# Patient Record
Sex: MALE | Race: WHITE | Employment: OTHER | URBAN - METROPOLITAN AREA
[De-identification: names, ages, dates, MRNs, and addresses within clinical notes are randomized per-mention and may not be internally consistent; named-entity substitution may affect disease eponyms.]

---

## 2017-09-18 ENCOUNTER — HOSPITAL ENCOUNTER (OUTPATIENT)
Dept: LAB | Age: 77
Discharge: HOME | End: 2017-09-18

## 2017-09-18 PROCEDURE — 88305 TISSUE EXAM BY PATHOLOGIST: CPT | Performed by: DERMATOLOGY

## 2017-10-02 ENCOUNTER — HOSPITAL ENCOUNTER (OUTPATIENT)
Dept: LAB | Age: 77
Discharge: HOME | End: 2017-10-02

## 2017-10-11 ENCOUNTER — TELEPHONE (OUTPATIENT)
Dept: DERMATOLOGY | Facility: AMBULATORY SURGERY CENTER | Age: 77
End: 2017-10-11

## 2017-10-26 ENCOUNTER — HOSPITAL ENCOUNTER (OUTPATIENT)
Dept: LAB | Age: 77
Discharge: HOME | End: 2017-10-26

## 2017-10-26 ENCOUNTER — OFFICE VISIT (OUTPATIENT)
Dept: DERMATOLOGY | Facility: AMBULATORY SURGERY CENTER | Age: 77
End: 2017-10-26

## 2017-10-26 VITALS
RESPIRATION RATE: 18 BRPM | HEIGHT: 67 IN | BODY MASS INDEX: 20.4 KG/M2 | HEART RATE: 60 BPM | WEIGHT: 130 LBS | DIASTOLIC BLOOD PRESSURE: 60 MMHG | TEMPERATURE: 98.2 F | OXYGEN SATURATION: 97 % | SYSTOLIC BLOOD PRESSURE: 130 MMHG

## 2017-10-26 DIAGNOSIS — D48.5 NEOPLASM OF UNCERTAIN BEHAVIOR OF SKIN OF TEMPORAL REGION: Primary | ICD-10-CM

## 2017-10-26 DIAGNOSIS — C44.329 SQUAMOUS CELL CARCINOMA OF SKIN OF RIGHT TEMPLE: Primary | ICD-10-CM

## 2017-10-26 PROCEDURE — 88305 TISSUE EXAM BY PATHOLOGIST: CPT | Performed by: NURSE PRACTITIONER

## 2017-10-26 RX ORDER — DOXYCYCLINE 100 MG/1
100 TABLET ORAL 2 TIMES DAILY
Status: ON HOLD | COMMUNITY
End: 2020-08-31

## 2017-10-26 RX ORDER — ISOSORBIDE MONONITRATE 30 MG/1
TABLET, EXTENDED RELEASE ORAL DAILY
COMMUNITY

## 2017-10-26 RX ORDER — BUMETANIDE 2 MG/1
1 TABLET ORAL DAILY
Status: ON HOLD | COMMUNITY
End: 2020-08-31

## 2017-10-26 NOTE — PATIENT INSTRUCTIONS
WOUND CARE INSTRUCTIONS    1. Keep the dressing clean and dry and do not remove for 48 hours. 2. Then change the dressing once a day as follows:  a. Wash hands before and after each dressing change. b. Remove dressing and wash site gently with mild soap and water, rinse, and pat dry.  c. Apply an ointment (Bacitracin, Polysporin, Neosporin, Petroleum jelly or Aquaphor). d. Apply a non-stick (Telfa) dressing or Band-Aid to cover the wound. Remove pressure bandage on saturday, then wash gently and apply a thin layer Vaseline and a band-aid to site daily for 1 week. 3. Watch for:  BLEEDING: A small amount of drainage may occur. If bleeding occurs, elevate and rest the surgery site. Apply gauze and steady pressure for 15 minutes. If bleeding continues, call this office. INFECTION: Signs of infection include increased redness, pain, warmth, drainage of pus, and fever. If this occurs, call this office. 4. Special Instructions (follow any that are checked):  · [x] You have stitches that DO NOT need to be removed. · [x] Avoid bending at the waist and heavy lifting for two days. · [x] Sleep with your head elevated for the next two nights. · [x] Rest the surgery site and keep it elevated as much as possible for two days. · [x] You may apply an ice-pack for 10-15 minutes every waking hour for the rest of the day. · [] Eat a soft diet and avoid hot food and hot drinks for the rest of the day. · [] Other instructions: Follow up as directed. Take Tylenol or Ibuprofen for pain as needed. Once the site is healed with no remaining bandages or open areas, protect your surgical site and scar from the sun, as this area will be more sensitive. Use a broad spectrum sunscreen SPF 30 or higher daily, and a chemical free product (one containing zinc oxide or titanium dioxide) is a good choice if the area is sensitive.     You may begin to gently massage the surgical site in 2-3 weeks, rubbing in a circular motion along the scar. This can help reduce swelling and thickness of a scar. A scar cream may be used beginnning 1 month after the surgery. If you have any questions or concerns, please call our office Monday through Friday at [phone].

## 2017-10-26 NOTE — PROGRESS NOTES
This note is written by Hari Speaker, as dictated by Silver Plasencia. Lucius Sosa MD.    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 male referred by Dr. Jumana Brown. He presented today for a consult. 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 This is a new squamous cell carcinoma present for many months described as a growing, bleeding, and crusting lesion with no prior treatment. Biopsy confirmed the diagnosis of squamous cell carcinoma in situ, and I reviewed the written pathology report. He reports the use of vinegar soaks. He is feeling well and in his usual state of health today. He has no pain, no current illnesses, no other skin concerns. His allergies, medications, medical, and social history are reviewed by me today. He has a biopsy- proven nodular and pigmented basal cell carcinoma on his left nasal tip. He has a biopsy-proven invasive squamous cell carcinoma, moderately differentiated, on his left temple. He has a biopsy-proven squamous cell carcinoma in situ on his left ulnar forearm. He has a biopsy-proven squamous cell carcinoma in situ on his right dorsal forearm. He has a defibrillator and is taking the blood thinner Plavix. Exam:     He is an awake, alert, and oriented 76 y.o. male who appears well and in no distress. There is no preauricular, submandibular, or cervical lymphadenopathy. I examined his face. He has a 42 x 40 mm exophytic odorous tumor on his right temple. He confirms location. Assessment/plan:    1. Squamous cell carcinoma at least in situ, right temple. A shave removal was advised to debulk this lesion and to have pathology review to confirm invasive diagnosis, which is expected over in situ disease. The procedure was reviewed and verbal and written consent were obtained. The risks of pain, bleeding, infection, recurrence and scar were discussed.   I performed the procedure. The site was cleansed and anesthetized with 1% Lidocaine with Epinephrine 1:100,000. A shave removal was performed to debulk the lesion. Hemostasis was obtained with heat and pressure. The specimen was sent to pathology. We have elected to proceed with Mohs surgery today to treat the remaining squamous cell carcinoma in situ on his right temple. Mohs surgery is indicated by site, size, and histology. The procedure was discussed, verbal and written consent were obtained. I performed the procedure. One stage was required to reach a tumor free plane. The surgical defect was managed with a bilateral advancement flap. There were no complications. He will follow up as needed as the site heals. Indications, risks, and options were discussed with Vianney Feliciano preoperatively. Risks including, but not limited to: pain, bleeding, infection, tumor recurrence, scarring and damage to motor and/or sensory nerves, were discussed. Vianney Feliciano chose Mohs surgery. Vianney Feliciano was an acceptable surgery candidate. Vianney Feliciano was placed in the appropriate position on the operating table in the Mohs surgery procedure room. The area was prepped and draped in the standard manner. Gentian violet was used to outline the clinical margins of the tumor. Local anesthesia was then obtained. The grossly visible tumor was then removed, an underlying layer was excised and mapped according to the Mohs technique, and the individual specimens examined microscopically. The process was repeated until microscopic examination of the tissue specimens confirmed a tumor-free plane. Hemostasis was obtained with electrosurgery and pressure. The wound was covered between stages with moist saline gauze. The wound management options of second intent healing, layered closure, local flap, or full thickness skin graft were discussed.  Mr. Fern Rutherfordpin understands the aims, risks, alternatives, and possible complications and elects to proceed with a bilateral advancement flap. Mr. Claire Bear was placed in a supine position on the operating table in the Mohs surgery procedure room. The area was prepped and draped in the standard manner. Gentian violet was used to outline the proposed flap.  1% lidocaine with epinephrine 1:100,000 was used to supplement the already existing anesthesia. The wound penetrated to the fascial layer and measured 40 x 26 mm. The wound margins were undermined in the subcutaneous plane 1 cm around the defect, creating a flap size of 60 by 46 mm. The flap was advanced into place. Hemostasis was obtained with heat and pressure. 4-0 polysorb sutures were used to approximate the deep tissues and 5-0 fast gut sutures were used to approximate the skin edges. A pressure dressing utilizing Vaseline, Telfa, gauze and Coverroll was placed. Wound care instructions (written and verbal) and a follow up appointment were given to the patient before discharge. Mr. Claire Bear was discharged in good condition. 2. Nonmelanoma skin cancers. He will return in December and January to address these lesions. 3. History of nonmelanoma skin cancer. I discussed the diagnosis and recommend routine examinations with Dr. Denisha Fallon for surveillance. The documentation recorded by the scribe accurately reflects the service I personally performed and the decisions made by me. [facility]   OFFICE PROCEDURE PROGRESS NOTE     Chart reviewed for the following:     Jovana Guzman MD, have reviewed the History, Physical and updated the Allergic reactions for [address] [place] [address] performed immediately prior to start of procedure:     Jovana Guzman MD, have performed the following reviews on Edgard Glover prior to the start of the procedure:     * Patient was identified by name and date of birth   * Agreement on procedure being performed was verified   * Risks and Benefits explained to the patient   * Procedure site verified and marked as necessary   * Patient was positioned for comfort   * Consent was signed and verified     Time: 1:30 PM   Date of procedure: 10/26/2017  Procedure performed by: Andrew Hall.  Diamond Portillo MD   Provider assisted by: MARTHA  Patient assisted by: self   How tolerated by patient: tolerated the procedure well with no complications   Comments: none

## 2017-10-26 NOTE — MR AVS SNAPSHOT
Visit Information Date & Time Provider Department Dept. Phone Encounter #  
 10/26/2017  2:00 PM MD Darrick Giraldo [phone] [id number] Your Appointments 10/26/2017  2:00 PM  
SURGERY with MD Darrick Giraldo [address]) Appt Note: ok per Dr. Luli Loyola [facility] [address] [facility] [address]  
[phone]  
  
   
 [place] [id number]  
  
    
 12/19/2017  9:30 AM  
SURGERY with MD Darrick Giraldo [address]) Appt Note: [facility] [address] [place] [address]  
[phone]  
  
    
 1/2/2018  8:30 AM  
SURGERY with MD Darrick Giraldo [address]) Appt Note: [facility] [address]  
[phone] 1/15/2018  9:30 AM  
SURGERY with MD Darrick Giraldo [address]) Appt Note: [address] [facility]  [address]  
[phone] Upcoming Health Maintenance Date Due DTaP/Tdap/Td series (1 - Tdap) 5/28/1959 ZOSTER VACCINE AGE 60> 3/28/1998 GLAUCOMA SCREENING Q2Y 5/28/2003 MEDICARE YEARLY EXAM 5/28/2003 Pneumococcal 65+ Low/Medium Risk (2 of 2 - PPSV23) 3/14/2017 INFLUENZA AGE 9 TO ADULT 8/1/2017 Allergies as of 10/26/2017  Review Complete On: 10/26/2017 By: Nolvia Ricks Severity Noted Reaction Type Reactions Codeine  04/29/2011    Unknown (comments) Penicillins  04/29/2011    Unknown (comments) Current Immunizations  Reviewed on 3/25/2012 Name Date Influenza Vaccine Split 11/16/2011 ZZZ-RETIRED (DO NOT USE) Pneumococcal Vaccine (Unspecified Type) 3/14/2012 11:51 AM  
  
 Not reviewed this visit Vitals Smoking Status Current Every Day Smoker Your Updated Medication List  
  
   
This list is accurate as of: 10/26/17  1:39 PM.  Always use your most recent med list.  
  
  
  
  
 albuterol 90 mcg/actuation inhaler Commonly known as:  PROVENTIL HFA, VENTOLIN HFA, PROAIR HFA Take 1 Puff by inhalation every six (6) hours as needed for Wheezing or Shortness of Breath. aluminum-magnesium hydroxide 200-200 mg/5 mL suspension Commonly known as:  MAALOX Take 15 mL by mouth four (4) times daily as needed for Indigestion. aspirin 81 mg chewable tablet Take 1 Tab by mouth daily. budesonide-formoterol 160-4.5 mcg/actuation Hfaa Commonly known as:  SYMBICORT Take 2 Puffs by inhalation two (2) times a day. bumetanide 2 mg tablet Commonly known as:  Kristy Bety Take 1 mg by mouth daily. calcium carbonate 200 mg calcium (500 mg) Chew Commonly known as:  TUMS Take 1 Tab by mouth three (3) times daily (with meals). carvedilol 12.5 mg tablet Commonly known as:  Wileen Qiu Take 1 Tab by mouth two (2) times daily (with meals). doxazosin 8 mg tablet Commonly known as:  CARDURA Take 8 mg by mouth daily. doxycycline 100 mg tablet Commonly known as:  ADOXA Take 100 mg by mouth two (2) times a day. fenofibrate nanocrystallized 145 mg tablet Commonly known as:  [employer] Take 1 Tab by mouth daily. furosemide 40 mg tablet Commonly known as:  LASIX 40 mg two times daily  
  
 isosorbide mononitrate ER 30 mg tablet Commonly known as:  IMDUR Take  by mouth daily. LIPITOR PO Take 10 mg by mouth daily. lisinopril 5 mg tablet Commonly known as:  Donnald Code Take 1 Tab by mouth daily. oxyCODONE-acetaminophen 5-325 mg per tablet Commonly known as:  PERCOCET Take 1 Tab by mouth every four (4) hours as needed for Pain.  
  
 pantoprazole 40 mg tablet Commonly known as:  PROTONIX Take 1 Tab by mouth daily. PLAVIX 75 mg Tab Generic drug:  clopidogrel Take 75 mg by mouth daily. potassium chloride SR 10 mEq tablet Commonly known as:  KLOR-CON 10 Take 1 Tab by mouth two (2) times a day. Patient Instructions WOUND CARE INSTRUCTIONS 1. Keep the dressing clean and dry and do not remove for 48 hours. 2. Then change the dressing once a day as follows: 
a. Wash hands before and after each dressing change. b. Remove dressing and wash site gently with mild soap and water, rinse, and pat dry. 
c. Apply an ointment (Bacitracin, Polysporin, Neosporin, Petroleum jelly or Aquaphor). d. Apply a non-stick (Telfa) dressing or Band-Aid to cover the wound. Remove pressure bandage on saturday, then wash gently and apply a thin layer Vaseline and a band-aid to site daily for 1 week. 3. Watch for: BLEEDING: A small amount of drainage may occur. If bleeding occurs, elevate and rest the surgery site. Apply gauze and steady pressure for 15 minutes. If bleeding continues, call this office. INFECTION: Signs of infection include increased redness, pain, warmth, drainage of pus, and fever. If this occurs, call this office. 4. Special Instructions (follow any that are checked): ·  You have stitches that DO NOT need to be removed. ·  Avoid bending at the waist and heavy lifting for two days. ·  Sleep with your head elevated for the next two nights. ·  Rest the surgery site and keep it elevated as much as possible for two days. ·  You may apply an ice-pack for 10-15 minutes every waking hour for the rest of the day. ·  Eat a soft diet and avoid hot food and hot drinks for the rest of the day. ·  Other instructions: Follow up as directed. Take Tylenol or Ibuprofen for pain as needed.  
 
 
Once the site is healed with no remaining bandages or open areas, protect your surgical site and scar from the sun, as this area will be more sensitive. Use a broad spectrum sunscreen SPF 30 or higher daily, and a chemical free product (one containing zinc oxide or titanium dioxide) is a good choice if the area is sensitive. You may begin to gently massage the surgical site in 2-3 weeks, rubbing in a circular motion along the scar. This can help reduce swelling and thickness of a scar. A scar cream may be used beginnning 1 month after the surgery. If you have any questions or concerns, please call our office Monday through Friday at [phone]. Introducing [facility]! Marilyn Mora introduces [facility] patient portal. Now you can access parts of your medical record, email your doctor's office, and request medication refills online. 1. In your internet browser, go to [email or web address]. [employer]/"[employer] 2. Click on the First Time User? Click Here link in the Sign In box. You will see the New Member Sign Up page. 3. Enter your [facility] Access Code exactly as it appears below. You will not need to use this code after youve completed the sign-up process. If you do not sign up before the expiration date, you must request a new code. · [facility] Access Code: [email or web address] Expires: 1/24/2018  1:39 PM 
 
4. Enter the last four digits of your Social Security Number (xxxx) and Date of Birth (mm/dd/yyyy) as indicated and click Submit. You will be taken to the next sign-up page. 5. Create a [facility] ID. This will be your [facility] login ID and cannot be changed, so think of one that is secure and easy to remember. 6. Create a [facility] password. You can change your password at any time. 7. Enter your Password Reset Question and Answer. This can be used at a later time if you forget your password. 8. Enter your e-mail address. You will receive e-mail notification when new information is available in [address] 9. Click Sign Up. You can now view and download portions of your medical record. 10. Click the Download Summary menu link to download a portable copy of your medical information. If you have questions, please visit the Frequently Asked Questions section of the [employer] website. Remember, [employer] is NOT to be used for urgent needs. For medical emergencies, dial 911. Now available from your iPhone and Android! Please provide this summary of care documentation to your next provider. Your primary care clinician is listed as Sabina Valencia. If you have any questions after today's visit, please call [phone].

## 2017-10-26 NOTE — PROGRESS NOTES
Written by Aristeo Goldberg, as dictated by Rita Juares, [address]. Name: Bruna Garcia       Age: 76 y.o. Date: 10/26/2017    Chief Complaint: had no chief complaint listed for this encounter. Subjective:    HPI:  Mr.. Bruna Garcia is a 76 y.o. male who was kindly referred by Dr. Radha Shay and presents for a consult prior to Mohs surgery for a lesion on the right temple. The lesion appeared many months ago. The patient has not had any prior treatment. He has been utilizing vinegar soaks. Associated symptoms include growth, odor, bleeding, and crusting. The patient's pathology report confirms squamous cell carcinoma in situ on his right temple. He has a biopsy- proven nodular and pigmented basal cell carcinoma on his left nasal tip. He has a biopsy-proven invasive squamous cell carcinoma, moderately differentiated, on his left temple. He has a biopsy-proven squamous cell carcinoma in situ on his left ulnar forearm. He has a biopsy-proven squamous cell carcinoma in situ on his right dorsal forearm. He has a defibrillator and is taking the blood thinner Plavix. ROS: Consitutional: Negative. Dermatological : positive for - skin lesion changes    Social History     Social History    Marital status:      Spouse name: N/A    Number of children: N/A    Years of education: N/A     Occupational History    Not on file.      Social History Main Topics    Smoking status: Current Every Day Smoker     Packs/day: 1.00     Years: 55.00    Smokeless tobacco: Not on file    Alcohol use No    Drug use: Yes     Special: Prescription    Sexual activity: Not Currently     Other Topics Concern    Not on file     Social History Narrative    No narrative on file       Family History   Problem Relation Age of Onset    Heart Disease Mother     Heart Disease Father        Past Medical History:   Diagnosis Date    CAD (coronary artery disease)     [facility] [address] 2005    prostate    [facility] failure ([facility] [address]     Hypertension     Thromboembolus ([facility] [address] 2005    r leg       Past Surgical History:   Procedure Laterality Date    CARDIAC SURG PROCEDURE UNLIST  2006    cardiac bypass    HX APPENDECTOMY      HX OTHER SURGICAL      Defibrillator (external)    HX PROSTATECTOMY      prostate sgy       Current Outpatient Prescriptions   Medication Sig Dispense Refill    oxyCODONE-acetaminophen (PERCOCET) 5-325 mg per tablet Take 1 Tab by mouth every four (4) hours as needed for Pain. 30 Tab 0    furosemide (LASIX) 40 mg tablet 40 mg two times daily   60 Tab 0    potassium chloride SR (KLOR-CON 10) 10 mEq tablet Take 1 Tab by mouth two (2) times a day. 60 Tab 0    lisinopril (PRINIVIL, ZESTRIL) 5 mg tablet Take 1 Tab by mouth daily. 30 Tab 0    fenofibrate nanocrystallized (TRICOR) 145 mg tablet Take 1 Tab by mouth daily. 30 Tab 0    carvedilol (COREG) 12.5 mg tablet Take 1 Tab by mouth two (2) times daily (with meals). 30 Tab 0    calcium carbonate (TUMS) 200 mg calcium (500 mg) Chew Take 1 Tab by mouth three (3) times daily (with meals). 30 Tab 0    budesonide-formoterol (SYMBICORT) 160-4.5 mcg/Actuation HFA inhaler Take 2 Puffs by inhalation two (2) times a day. 1 Inhaler 1    aspirin 81 mg chewable tablet Take 1 Tab by mouth daily. 30 Tab 0    aluminum-magnesium hydroxide (MAALOX) 200-200 mg/5 mL suspension Take 15 mL by mouth four (4) times daily as needed for Indigestion. 300 mL 0    pantoprazole (PROTONIX) 40 mg tablet Take 1 Tab by mouth daily. 30 Tab 0    albuterol (PROVENTIL HFA, VENTOLIN HFA) 90 mcg/Actuation inhaler Take 1 Puff by inhalation every six (6) hours as needed for Wheezing or Shortness of Breath. 1 Inhaler 1    doxazosin (CARDURA) 8 mg tablet Take 8 mg by mouth daily.  clopidogrel (PLAVIX) 75 mg tablet Take 75 mg by mouth daily.  ATORVASTATIN CALCIUM (LIPITOR PO) Take 10 mg by mouth daily.          Allergies   Allergen Reactions    Codeine Unknown (comments)    Penicillins Unknown (comments)         Objective: There were no vitals taken for this visit. Basilia Waters is a 76 y.o. male who appears well and in no distress. He is awake, alert, and oriented. There is no preauricular, submandibular, or cervical lymphadenopathy. A limited skin evaluation was completed including his face and arms. He has a 42 x 40 mm exophytic tumor on his right temple. He has a pink shiny papule on his left nasal tip. He has a crusted lesion on his left temple. He has a crusted lesion on his left ulnar forearm. He has a crusted lesion on his right dorsal forearm. There is a crusted papule on the left neck. Assessment/Plan:    1. Squamous cell carcinoma in situ, right temple. I discussed the diagnosis of squamous cell carcinoma in situ and summarized the pathology report. Mohs surgery is indicated by site, size, and histology. The procedure was discussed. We discussed the option of a shave removal to debulk the tumor. The procedure was reviewed and verbal and written consent were obtained. The risks of pain, bleeding, infection, recurrence and scar were discussed. Dr. Ольга Panchal performed the procedure. The site was cleansed and anesthetized with 1% Lidocaine with Epinephrine 1:100,000 by me. A shave removal was performed to debulk the tumor. Hemostasis was obtained with heat. Dr. Ольга Panchal will proceed with Mohs surgery today. 2. Basal cell carcinoma, left nasal tip. I discussed the diagnosis of basal cell carcinoma and summarized the pathology report. 3. Squamous cell carcinoma, left temple. I discussed the diagnosis of squamous cell carcinoma and summarized the pathology report. 4. Squamous cell carcinoma in situ, left ulnar forearm. I discussed the diagnosis of squamous cell carcinoma in situ and summarized the pathology report. We discussed the option of a topical treatment. 5. Squamous cell carcinoma in situ, right dorsal forearm.  I discussed the diagnosis of squamous cell carcinoma in situ and summarized the pathology report. We discussed the option of a topical treatment. This plan was reviewed with the patient and patient agrees. All questions were answered. This scribe documentation was reviewed by me and accurately reflects the examination and decisions made by me. [facility]   OFFICE PROCEDURE PROGRESS NOTE   Chart reviewed for the following:   Lewis WALLS, have reviewed the History, Physical and updated the Allergic reactions for Saravia Form. TIME OUT performed immediately prior to start of procedure:   Angelita WALLS, have performed the following reviews on Saravia Form   prior to the start of the procedure:     * Patient was identified by name and date of birth   * Agreement on procedure being performed was verified   * Risks and Benefits explained to the patient   * Procedure site verified and marked as necessary   * Patient was positioned for comfort   * Verbal consent was obtained    Time: 1:21 PM  Date of procedure: 10/26/2017  Procedure performed by: Dr. Sagar Bustos  Provider assisted by: me  Patient assisted by: self   How tolerated by patient: tolerated the procedure well with no complications   Comments: none    He understands that he will need multiple follow up visits with Dr. Lisa Sepulveda and us to identify and accomplish removal of the skin cancers.

## 2017-11-06 ENCOUNTER — HOSPITAL ENCOUNTER (OUTPATIENT)
Dept: LAB | Age: 77
Discharge: HOME | End: 2017-11-06

## 2017-11-06 NOTE — TELEPHONE ENCOUNTER
I called to check on the patient - he states the area is doing well post-op. I reviewed the results of the biopsy (debulking) with him confirming invasive SCC. This has already been treated with Mohs.

## 2017-12-19 ENCOUNTER — OFFICE VISIT (OUTPATIENT)
Dept: DERMATOLOGY | Facility: AMBULATORY SURGERY CENTER | Age: 77
End: 2017-12-19

## 2017-12-19 VITALS
HEART RATE: 86 BPM | OXYGEN SATURATION: 99 % | SYSTOLIC BLOOD PRESSURE: 134 MMHG | DIASTOLIC BLOOD PRESSURE: 84 MMHG | TEMPERATURE: 97.4 F | RESPIRATION RATE: 16 BRPM

## 2017-12-19 DIAGNOSIS — C44.329 SQUAMOUS CELL CARCINOMA OF SKIN OF LEFT TEMPLE: Primary | ICD-10-CM

## 2017-12-19 NOTE — PATIENT INSTRUCTIONS
WOUND CARE INSTRUCTIONS    1. Keep the dressing clean and dry and do not remove for 48 hours. 2. Then change the dressing once a day as follows:  a. Wash hands before and after each dressing change. b. Remove dressing and wash site gently with mild soap and water, rinse, and pat dry.  c. Apply an ointment (Bacitracin, Polysporin, Neosporin, Petroleum jelly or Aquaphor). d. Apply a non-stick (Telfa) dressing or Band-Aid to cover the wound. Remove pressure bandage on thursday, then wash gently and apply a thin layer Vaseline and a band-aid to site daily for 1 week. 3. Watch for:  BLEEDING: A small amount of drainage may occur. If bleeding occurs, elevate and rest the surgery site. Apply gauze and steady pressure for 15 minutes. If bleeding continues, call this office. INFECTION: Signs of infection include increased redness, pain, warmth, drainage of pus, and fever. If this occurs, call this office. 4. Special Instructions (follow any that are checked):  · [x] You have stitches that DO NOT need to be removed. · [x] Avoid bending at the waist and heavy lifting for two days. · [x] Sleep with your head elevated for the next two nights. · [x] Rest the surgery site and keep it elevated as much as possible for two days. · [] You may apply an ice-pack for 10-15 minutes every waking hour for the rest of the day. · [] Eat a soft diet and avoid hot food and hot drinks for the rest of the day. · [] Other instructions: Follow up as directed. Take Tylenol or Ibuprofen for pain as needed. Once the site is healed with no remaining bandages or open areas, protect your surgical site and scar from the sun, as this area will be more sensitive. Use a broad spectrum sunscreen SPF 30 or higher daily, and a chemical free product (one containing zinc oxide or titanium dioxide) is a good choice if the area is sensitive.     You may begin to gently massage the surgical site in 2-3 weeks, rubbing in a circular motion along the scar. This can help reduce swelling and thickness of a scar. A scar cream may be used beginnning 1 month after the surgery. If you have any questions or concerns, please call our office Monday through Friday at [phone].

## 2017-12-19 NOTE — PROGRESS NOTES
Pre-op: Patient presents today for the evaluation of scc to the left temple. Procedure explained with full understanding. Vitals:    12/19/17 1000   BP: 134/84   Pulse: 86   Resp: 16   Temp: 97.4 °F (36.3 °C)   SpO2: 99%     preoperatively, will continue to monitor. Post-op: Written and verbal post-op wound care instructions given to patient with full understanding of care. Surgical wound bandaged with Vaseline, Telfa, 2x2 gauze, and coverall tape. All questions and concerns addressed. Vitals stable postoperatively.

## 2017-12-19 NOTE — PROGRESS NOTES
Chief complaint: Multiple skin cancers    History of present illness: Mr. Suanne Galeazzi is a 73-year-old man referred by Dr. Alexandrea Sanches. He has clinically and histologically diagnosed neglected skin cancers. 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 He has recovered well from that surgery and feels much better without the large tumor. Today treatment plan for his next largest skin cancer, squamous cell carcinoma proven by biopsy on his left temple. This has increased in size, has crusting and symptom and has not been previously treated. He has a defibrillator. He is feeling well and in his usual state of health today. He has no pain, no current illnesses, no other skin concerns. His allergies medications medical and social history are reviewed by me today. His friend and grandson or with him today. Exam: He is awake alert gentleman who appears well. I examined his face, where there is immediate noticeable improvement on the right side after clearance of the large tumor and healing of the flap. His left temple area has a firm mobile crusted plaque 32 x 24 mm. He confirms location. Assessment/plan:  1. History of squamous cell carcinoma, recently treated. He has healed beautifully. He will continue examinations with Dr. Alexandrea Sanches for surveillance. He will return in my office or treatment of additional skin cancers in 2018.  2. Squamous cell carcinoma, left temple. We discussed the diagnosis. Mohs surgery is indicated by site and size. The procedure was discussed, verbal and written consent were obtained. I performed the procedure. 2 stages were required to reach a tumor free plane. The surgical defect was managed with rotation flap. There were no complications. He will followup as needed as the site heals.                     Indications, risks, and options were discussed with  Dug[place] preoperatively. Risks including, but not limited to: pain, bleeding, infection, tumor recurrence, scarring and damage to motor and/or sensory nerves, were discussed. Mr. Sulma Adam chose Mohs surgery. Mr. Sulma Adam was an acceptable surgery candidate. Mr. Sulma Adam was placed in the appropriate position on the operating table in the Mohs surgery procedure room. The area was prepped and draped in the standard manner. Gentian violet was used to outline the clinical margins of the tumor. Local anesthesia was then obtained. The grossly visible tumor was then removed, an underlying layer was excised and mapped according to the Mohs technique, and the individual specimens examined microscopically. The process was repeated until microscopic examination of the tissue specimens confirmed a tumor-free plane. Hemostasis was obtained with electrosurgery and pressure. The wound was covered between stages with moist saline gauze. Wound care instructions (written and/or verbal) and a follow up appointment were given to Mr. Sulma Adam before discharge. Mr. Sulma Adam was discharged in good condition. The wound management options of second intent healing, layered closure, local flap, or full thickness skin graft were discussed. Mr. Sulma Adam understands the aims, risks, alternatives, and possible complications and elects to proceed with a rotation flap. Mr. Sulma Adam was placed in a supine position on the operating table in the Mohs surgery procedure room. The area was prepped and draped in the standard manner. Gentian violet was used to outline the proposed flap.  1% lidocaine with epinephrine 1:100,000 was used to supplement the already existing anesthesia. The wound penetrated to the fascial layer and measured 47 by 30 mm. The wound margins were undermined in the subcutaneous plane. The flap was rotated into place. Hemostasis was obtained with spot electrocoagulation.   5-0 polysorb sutures were used to approximate the deep tissues and 6-0 fast gut sutures were used to approximate the skin edges. A pressure dressing utilizing Petrolatum ointment, Telfa, gauze and Coverroll was placed. Wound care instructions (written and/or verbal) and a follow up appointment were given to the patient before discharge. Mr. Afia Desai was discharged in good condition.         [facility]  OFFICE PROCEDURE PROGRESS NOTE        Chart reviewed for the following:   Makeda Davis MD, have reviewed the History, Physical and updated the Allergic reactions for [address] [place] [address] performed immediately prior to start of procedure:   Makeda Davis MD, have performed the following reviews on Roxanne Rosenberg prior to the start of the procedure:            * Patient was identified by name and date of birth   * Agreement on procedure being performed was verified  * Risks and Benefits explained to the patient  * Procedure site verified and marked as necessary  * Patient was positioned for comfort  * Consent was signed and verified     Time:       Date of procedure: 12/19/2017    Procedure performed by:  Serge Mattson MD    Provider assisted by:  LPN    Patient assisted by: self    How tolerated by patient: tolerated the procedure well with no complications    Post Procedural Pain Scale: 0 - No Hurt    Comments: none

## 2018-01-02 ENCOUNTER — HOSPITAL ENCOUNTER (OUTPATIENT)
Dept: LAB | Age: 78
Discharge: HOME | End: 2018-01-02

## 2018-01-02 ENCOUNTER — OFFICE VISIT (OUTPATIENT)
Dept: DERMATOLOGY | Facility: AMBULATORY SURGERY CENTER | Age: 78
End: 2018-01-02

## 2018-01-02 VITALS
DIASTOLIC BLOOD PRESSURE: 56 MMHG | BODY MASS INDEX: 20.42 KG/M2 | HEART RATE: 75 BPM | RESPIRATION RATE: 18 BRPM | TEMPERATURE: 97.9 F | HEIGHT: 67 IN | OXYGEN SATURATION: 98 % | WEIGHT: 130.07 LBS | SYSTOLIC BLOOD PRESSURE: 118 MMHG

## 2018-01-02 DIAGNOSIS — C44.309: ICD-10-CM

## 2018-01-02 DIAGNOSIS — L57.0 ACTINIC KERATOSES: ICD-10-CM

## 2018-01-02 DIAGNOSIS — D04.61 SQUAMOUS CELL CARCINOMA IN SITU OF SKIN OF FOREARM, RIGHT: ICD-10-CM

## 2018-01-02 DIAGNOSIS — L57.8 ACTINIC SKIN DAMAGE: ICD-10-CM

## 2018-01-02 DIAGNOSIS — D04.62: ICD-10-CM

## 2018-01-02 DIAGNOSIS — D48.5 NEOPLASM OF UNCERTAIN BEHAVIOR OF SKIN OF NECK: Primary | ICD-10-CM

## 2018-01-02 DIAGNOSIS — Z85.828 HISTORY OF NONMELANOMA SKIN CANCER: ICD-10-CM

## 2018-01-02 NOTE — PROGRESS NOTES
Written by Robb Davis, as dictated by Dr. Thu Mcclellan. Niya Black MD.    CC: Suspected squamous cell carcinoma on the left neck    History of Present Illness:    Deedee Allan is a 76 y.o. male referred by Dr. Cornell Herman. He has a suspected squamous cell carcinoma on the left neck. This is a new suspected squamous cell carcinoma described as a bothersome crusted lesion with no prior treatment. This is a clinically diagnosed squamous cell carcinoma on the left neck, no biopsy has been performed to date (adjacent biopsy by Dr. Cornell Herman showed lentigo. He is feeling well and in his usual state of health today. He has no pain, no current illnesses, no other skin concerns. His allergies, medications, medical, and social history are reviewed by me today. I performed Mohs surgery to treat a squamous cell carcinoma in situ on his right temple on 10/26/2017. I performed Mohs surgery to treat a squamous cell carcinoma on his left temple on 12/19/2017. Both sites are healing well. 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 He has a defibrillator and is taking the blood thinner Plavix. Exam:    He is an awake, alert, and oriented 76 y.o. male who appears well and in no distress.  I examined his left neck. He has a 31 x 14 mm crusted plaque on his left neck. He confirms the location. He has well healed surgical sites on his right temple and left temple, no evidence of lesion recurrences. He has a basal cell carcinoma, a dipped scar, on his left nasal tip that has remained stable. Upon examination, I noticed a 26 x 15 mm crusted patch with erythematous base on his right temple. He has hypertrophic actinic keratoses and diffuse actinic damage on both arms; the largest plaque measures 6 x 4 cm on his right dorsal forearm. Assessment/Plan:    1. Suspected squamous cell carcinoma, left neck.  Excision was advised for removal and therapy of this 31 x 14 mm lesion on the left neck.  The excision procedure was reviewed and verbal and written consents were obtained.  The risks of pain, bleeding, infection, recurrence of the lesion, and scar were discussed.  I performed the procedure.  The site was cleansed and anesthetized with 1% lidocaine with epinephrine 1:100,000.  The lesion was excised with a 3 mm margin in an elliptical manner to a depth of subcutaneous tissue.  Hemostasis was obtained with heat and pressure. An intermediate repair was performed after the excision. 4-0 polysorb suture was used for approximation of deep tissues and a second layer of 4-0 polysorb suture was used to approximate the skin edges. The closure length was 54 mm.  The wound was bandaged with skin glue and a pressure dressing, and care reviewed.  The specimen was sent to pathology. I will contact the patient with the results and any further treatment that may be necessary. 2. Basal cell carcinoma, left nasal tip. The diagnosis was discussed. Due to the stable nature of this basal cell carcinoma on his left nasal tip, treatment will be addressed in the future. 3. Neoplasm of Uncertain Behavior, right temple. The differential diagnoses were discussed. A shave biopsy was advised to sample this lesion. The procedure was reviewed and verbal and written consent were obtained. The risks of pain, bleeding, infection, and scar were discussed. The patient is aware that this is a sample and is intended for diagnosis and not therapy of the skin lesion. I performed the procedure. The site was cleansed and anesthetized with 1% Lidocaine with Epinephrine 1:100,000. A shave biopsy was performed to sample the lesion. Drysol was used for hemostasis. The wound was bandaged and care reviewed. The specimen was sent to pathology. I will contact the patient with the results and any further treatment that may be necessary.         4. Actinic Keratoses, left arm and right arm, and SCC in situs on the forearms. The diagnosis of this precancerous lesion related to sun exposure was reviewed. We discussed different management methods. The largest plaques, one on the right forearm which has been shown to be squamous cell carcinoma in situ and another squamous cell carcinoma in situ on the left forearm, present a challenge for surgical treatment due to size, meaning advanced reconstruction would likely be required if excision were attempted. My advice for Mr. Estefania Larsen was to discuss topical treatment options with Dr. Ryna Campos, as this could clear much of the background activity damage and potentially some of the in situ cancer, reducing the tumor burden and the size of future surgical defects. He plans to discuss this with Dr. Ryan Campos during his appointment in February. 5. Personal history of skin cancer. I discussed sun protection, sunscreen use, the warning signs of skin cancer, the need for self-skin examinations, and the need for regular practitioner exams at least every 6 months. The patient should follow up sooner as needed if new, changing, or symptomatic skin lesions arise. The documentation recorded by the scribe accurately reflects the service I personally performed and the decisions made by me. [facility]   OFFICE PROCEDURE PROGRESS NOTE     Chart reviewed for the following:     Glen Gutierrez MD, have reviewed the History, Physical and updated the Allergic reactions for [address] performed immediately prior to start of procedure:     Glen Gutierrez MD, have performed the following reviews on Valentín Kiran prior to the start of the procedure:     * Patient was identified by name and date of birth   * Agreement on procedure being performed was verified   * Risks and Benefits explained to the patient   * Procedure site verified and marked as necessary   * Patient was positioned for comfort   * Consent was signed and verified     Time: 8:28 AM   Date of procedure: 1/2/2018  Procedure performed by: Alicja Arzate.  Sara Francis MD   Provider assisted by: RN  Patient assisted by: self   How tolerated by patient: tolerated the procedure well with no complications   Comments: none

## 2018-01-29 ENCOUNTER — OFFICE VISIT (OUTPATIENT)
Dept: DERMATOLOGY | Facility: AMBULATORY SURGERY CENTER | Age: 78
End: 2018-01-29

## 2018-01-29 VITALS
SYSTOLIC BLOOD PRESSURE: 120 MMHG | OXYGEN SATURATION: 98 % | HEIGHT: 67 IN | WEIGHT: 130 LBS | HEART RATE: 49 BPM | BODY MASS INDEX: 20.4 KG/M2 | RESPIRATION RATE: 20 BRPM | DIASTOLIC BLOOD PRESSURE: 50 MMHG

## 2018-01-29 DIAGNOSIS — C44.319 BASAL CELL CARCINOMA OF RIGHT TEMPLE REGION: Primary | ICD-10-CM

## 2018-01-29 RX ORDER — ATORVASTATIN CALCIUM 10 MG/1
TABLET, FILM COATED ORAL
Refills: 11 | COMMUNITY
Start: 2018-01-21

## 2018-01-29 RX ORDER — BUMETANIDE 1 MG/1
TABLET ORAL
Refills: 8 | COMMUNITY
Start: 2017-12-17

## 2018-01-29 RX ORDER — NITROGLYCERIN 0.4 MG/1
TABLET SUBLINGUAL
Refills: 0 | COMMUNITY
Start: 2018-01-05

## 2018-01-29 RX ORDER — FENOFIBRATE 160 MG/1
TABLET ORAL
Refills: 11 | COMMUNITY
Start: 2018-01-21

## 2018-01-29 RX ORDER — POTASSIUM CHLORIDE 750 MG/1
TABLET, EXTENDED RELEASE ORAL
Refills: 5 | COMMUNITY
Start: 2017-12-27

## 2018-01-29 RX ORDER — MUPIROCIN 20 MG/G
OINTMENT TOPICAL
Refills: 1 | Status: ON HOLD | COMMUNITY
Start: 2017-11-06 | End: 2020-08-31

## 2018-01-29 NOTE — PATIENT INSTRUCTIONS
WOUND CARE INSTRUCTIONS    1. Keep the dressing clean and dry and do not remove for 48 hours. 2. Then change the dressing once a day as follows:  a. Wash hands before and after each dressing change. b. Remove dressing and wash site gently with mild soap and water, rinse, and pat dry.  c. Apply an ointment (Bacitracin, Polysporin, Neosporin, Petroleum jelly or Aquaphor). d. Apply a non-stick (Telfa) dressing or Band-Aid to cover the wound. Remove pressure bandage on Wednesday, then wash gently and apply a thin layer Vaseline and a band-aid to site daily for 1 week. 3. Watch for:  BLEEDING: A small amount of drainage may occur. If bleeding occurs, elevate and rest the surgery site. Apply gauze and steady pressure for 15 minutes. If bleeding continues, call this office. INFECTION: Signs of infection include increased redness, pain, warmth, drainage of pus, and fever. If this occurs, call this office. 4. Special Instructions (follow any that are checked):  · [x] You have stitches that DO/ DO NOT need to be removed. · [x] Avoid bending at the waist and heavy lifting for two days. · [x] Sleep with your head elevated for the next two nights. · [x] Rest the surgery site and keep it elevated as much as possible for two days. · [x] You may apply an ice-pack for 10-15 minutes every waking hour for the rest of the day. · [] Eat a soft diet and avoid hot food and hot drinks for the rest of the day. · [] Other instructions: Follow up as directed. Take Tylenol or Ibuprofen for pain as needed. Once the site is healed with no remaining bandages or open areas, protect your surgical site and scar from the sun, as this area will be more sensitive. Use a broad spectrum sunscreen SPF 30 or higher daily, and a chemical free product (one containing zinc oxide or titanium dioxide) is a good choice if the area is sensitive.     You may begin to gently massage the surgical site in 2-3 weeks, rubbing in a circular motion along the scar. This can help reduce swelling and thickness of a scar. A scar cream may be used beginnning 1 month after the surgery. If you have any questions or concerns, please call our office Monday through Friday at [phone].

## 2018-01-29 NOTE — PROGRESS NOTES
This note is written by Ella Pinzon, as dictated by aJred Helm. Wilma Leblanc MD.    CC: Basal cell carcinoma on the right temple     History of present illness:     Stanford Chavez is a 76 y.o. male referred by Dr. Marlena Johansen. He has a biopsy-proven nodular basal cell carcinoma on the right temple. This is a new basal cell carcinoma present for more than six months described as a bothersome lesion with no prior treatment. Biopsy confirmed the diagnosis of basal cell carcinoma, and I reviewed the written pathology report. He is feeling well and in his usual state of health today. He has no pain, no current illnesses, no other skin concerns. His allergies, medications, medical, and social history are reviewed by me today. Exam:     He is an awake, alert, and oriented 76 y.o. male who appears well and in no distress. There is no preauricular, submandibular, or cervical lymphadenopathy. I examined his right temple. He has a 22 x 14 mm crusted plaque on his right temple. He confirms location. Assessment/plan:    1. Basal cell carcinoma, right temple. I discussed the diagnosis of basal cell carcinoma and summarized the pathology report. Mohs surgery is indicated by site and size. The procedure was discussed, verbal and written consent were obtained. I performed the procedure. Five stages were required to reach a tumor free plane. The surgical defect was managed with a bilateral advancement flap. There were no complications. He will follow up as needed as the site heals. Indications, risks, and options were discussed with Stanford Chavez preoperatively. Risks including, but not limited to: pain, bleeding, infection, tumor recurrence, scarring and damage to motor and/or sensory nerves, were discussed. Stanford Chavez chose Mohs surgery. Stanford Chavez was an acceptable surgery candidate. Stanford Chavez was placed in the appropriate position on the operating table in the Mohs surgery procedure room.  The area was prepped and draped in the standard manner. Gentian violet was used to outline the clinical margins of the tumor. Local anesthesia was then obtained. The grossly visible tumor was then removed, an underlying layer was excised and mapped according to the Mohs technique, and the individual specimens examined microscopically. The process was repeated until microscopic examination of the tissue specimens confirmed a tumor-free plane. Hemostasis was obtained with a 5-0 polysorb suture, electrosurgery and pressure. The wound was covered between stages with moist saline gauze. The wound management options of second intent healing, layered closure, local flap, or full thickness skin graft were discussed. Mr. Bernard Cranker understands the aims, risks, alternatives, and possible complications and elects to proceed with a bilateral advancement flap. Mr. Bernard Cranker was placed in a supine position on the operating table in the Mohs surgery procedure room. The area was prepped and draped in the standard manner. Gentian violet was used to outline the proposed flap.  1% lidocaine with epinephrine 1:100,000 was used to supplement the already existing anesthesia. The wound penetrated to the fascial layer and measured 54 x 35 mm. The wound margins were undermined in the subcutaneous plane. The flap was advanced into place. Hemostasis was obtained with spot electrocoagulation. 4-0 polysorb sutures were used to approximate the deep tissues and 5-0 fast gut sutures were used to approximate the skin edges. A pressure dressing utilizing Vaseline, Telfa, gauze and Coverroll was placed. Wound care instructions (written and verbal) and a follow up appointment were given to the patient before discharge. Mr. Bernard Cranker was discharged in good condition. 2. History of nonmelanoma skin cancer. I discussed the diagnosis and recommend routine examinations with Dr. Lethia Goltz for surveillance.     The documentation recorded by the scribe accurately reflects the service I personally performed and the decisions made by me. [facility]   OFFICE PROCEDURE PROGRESS NOTE     Chart reviewed for the following:     Mariana Mckenna MD, have reviewed the History, Physical and updated the Allergic reactions for [address] [place] [address] performed immediately prior to start of procedure:     Mariana Mckenna MD, have performed the following reviews on [facility] prior to the start of the procedure:     * Patient was identified by name and date of birth   * Agreement on procedure being performed was verified   * Risks and Benefits explained to the patient   * Procedure site verified and marked as necessary   * Patient was positioned for comfort   * Consent was signed and verified     Time: 2:02 PM   Date of procedure: 1/29/2018  Procedure performed by: Rambo Mckenna MD   Provider assisted by: LPN   Patient assisted by: self   How tolerated by patient: tolerated the procedure well with no complications   Comments: none

## 2018-01-29 NOTE — MR AVS SNAPSHOT
[address] [facility] [address] [place] [address] [place] [phone] Patient: Yuliana Fischer MRN: [id number] RAVI:1/30/[birth year]12 Visit Information Date & Time Provider Department Dept. Phone Encounter #  
 1/29/2018  2:00 PM MD Darrick Rojas [id number]  Your Appointments 2/7/2018  8:15 AM  
SURGERY with MD Darrick Rojas 8057 [facility]) Appt Note: BCC Lt Nasal tip Dr. Maribeth Jiang [address] [place] [address]  
Formerly [facility] [place] [address] [facility] [address] Upcoming Health Maintenance Date Due DTaP/Tdap/Td series (1 - Tdap) 5/28/1959 ZOSTER VACCINE AGE 60> 3/28/1998 GLAUCOMA SCREENING Q2Y 5/28/2003 MEDICARE YEARLY EXAM 5/28/2003 Pneumococcal 65+ High/Highest Risk (2 of 2 - PPSV23) 3/14/2017 Influenza Age 5 to Adult 8/1/2017 Allergies as of 1/29/2018  Review Complete On: 1/29/2018 By: Emerson Naik MD  
  
 Severity Noted Reaction Type Reactions Codeine  04/29/2011    Unknown (comments) Penicillins  04/29/2011    Unknown (comments) Current Immunizations  Reviewed on 3/25/2012 Name Date Influenza Vaccine Split 11/16/2011 ZZZ-RETIRED (DO NOT USE) Pneumococcal Vaccine (Unspecified Type) 3/14/2012 11:51 AM  
  
 Not reviewed this visit You Were Diagnosed With   
  
 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  
 120/50 (BP 1 Location: Left arm, BP Patient Position: Sitting) (!) 49 20 5' 7\" (1.702 m) 130 lb (59 kg) 98% BMI Smoking Status 20.36 kg/m2 Current Every Day Smoker BMI and BSA Data  Body Mass Index Body Surface Area  
 20.36 kg/m 2 1.67 m 2  
  
  
 Preferred Pharmacy Pharmacy Name Phone [id number] [address] AT [address] [place] [address] [phone] Your Updated Medication List  
  
   
This list is accurate as of: 1/29/18  5:22 PM.  Always use your most recent med list.  
  
  
  
  
 albuterol 90 mcg/actuation inhaler Commonly known as:  PROVENTIL HFA, VENTOLIN HFA, PROAIR HFA Take 1 Puff by inhalation every six (6) hours as needed for Wheezing or Shortness of Breath. aluminum-magnesium hydroxide 200-200 mg/5 mL suspension Commonly known as:  MAALOX Take 15 mL by mouth four (4) times daily as needed for Indigestion. aspirin 81 mg chewable tablet Take 1 Tab by mouth daily. budesonide-formoterol 160-4.5 mcg/actuation Hfaa Commonly known as:  SYMBICORT Take 2 Puffs by inhalation two (2) times a day. * bumetanide 2 mg tablet Commonly known as:  Dorotha Marquis Take 1 mg by mouth daily. * bumetanide 1 mg tablet Commonly known as:  Dorotha Marquis TAKE 1 TABLET IN THE MORNING AND 1 TABLET IN THE EVENING  
  
 calcium carbonate 200 mg calcium (500 mg) Chew Commonly known as:  TUMS Take 1 Tab by mouth three (3) times daily (with meals). carvedilol 12.5 mg tablet Commonly known as:  Delphina [place] [employer] Take 1 Tab by mouth two (2) times daily (with meals). doxazosin 8 mg tablet Commonly known as:  CARDURA Take 8 mg by mouth daily. doxycycline 100 mg tablet Commonly known as:  ADOXA Take 100 mg by mouth two (2) times a day. fenofibrate 160 mg tablet Commonly known as:  LOFIBRA TAKE 1 TABLET DAILY  
  
 fenofibrate nanocrystallized 145 mg tablet Commonly known as:  [employer] Take 1 Tab by mouth daily. furosemide 40 mg tablet Commonly known as:  LASIX 40 mg two times daily  
  
 isosorbide mononitrate ER 30 mg tablet Commonly known as:  IMDUR Take  by mouth daily. * LIPITOR PO Take 10 mg by mouth daily. * atorvastatin 10 mg tablet Commonly known as:  LIPITOR  
TAKE ONE (1) TABLET DAILY AT BEDTIME  
  
 lisinopril 5 mg tablet Commonly known as:  Placido Art Take 1 Tab by mouth daily. mupirocin 2 % ointment Commonly known as:  BACTROBAN  
APPLY 1 APPLICATION TO THE AFFECTED AREA(S) OF RIGHT TEMPLE OF FACE TWICE A DAY  
  
 nitroglycerin 0.4 mg SL tablet Commonly known as:  NITROSTAT PLACE 1 TABLET UNDER TONGUE AT 1ST SIGN OF ATTACK. REPEAT EVERY 5 MIN UNTIL RELIEF. CALL 911  
  
 oxyCODONE-acetaminophen 5-325 mg per tablet Commonly known as:  PERCOCET Take 1 Tab by mouth every four (4) hours as needed for Pain.  
  
 pantoprazole 40 mg tablet Commonly known as:  PROTONIX Take 1 Tab by mouth daily. PLAVIX 75 mg Tab Generic drug:  clopidogrel Take 75 mg by mouth daily. * potassium chloride SR 10 mEq tablet Commonly known as:  KLOR-CON 10 Take 1 Tab by mouth two (2) times a day. * KLOR-CON M10 10 mEq tablet Generic drug:  potassium chloride TAKE ONE (1) TABLET(S) TWICE DAILY. TAKE WITH L ASIX (FUROSEMIDE). * Notice: This list has 6 medication(s) that are the same as other medications prescribed for you. Read the directions carefully, and ask your doctor or other care provider to review them with you. Patient Instructions WOUND CARE INSTRUCTIONS 1. Keep the dressing clean and dry and do not remove for 48 hours. 2. Then change the dressing once a day as follows: 
a. Wash hands before and after each dressing change. b. Remove dressing and wash site gently with mild soap and water, rinse, and pat dry. 
c. Apply an ointment (Bacitracin, Polysporin, Neosporin, Petroleum jelly or Aquaphor). d. Apply a non-stick (Telfa) dressing or Band-Aid to cover the wound. Remove pressure bandage on Wednesday, then wash gently and apply a thin layer Vaseline and a band-aid to site daily for 1 week. 3. Watch for: BLEEDING: A small amount of drainage may occur. If bleeding occurs, elevate and rest the surgery site. Apply gauze and steady pressure for 15 minutes. If bleeding continues, call this office. INFECTION: Signs of infection include increased redness, pain, warmth, drainage of pus, and fever. If this occurs, call this office. 4. Special Instructions (follow any that are checked): ·  You have stitches that DO/ DO NOT need to be removed. ·  Avoid bending at the waist and heavy lifting for two days. ·  Sleep with your head elevated for the next two nights. ·  Rest the surgery site and keep it elevated as much as possible for two days. ·  You may apply an ice-pack for 10-15 minutes every waking hour for the rest of the day. ·  Eat a soft diet and avoid hot food and hot drinks for the rest of the day. ·  Other instructions: Follow up as directed. Take Tylenol or Ibuprofen for pain as needed. Once the site is healed with no remaining bandages or open areas, protect your surgical site and scar from the sun, as this area will be more sensitive. Use a broad spectrum sunscreen SPF 30 or higher daily, and a chemical free product (one containing zinc oxide or titanium dioxide) is a good choice if the area is sensitive. You may begin to gently massage the surgical site in 2-3 weeks, rubbing in a circular motion along the scar. This can help reduce swelling and thickness of a scar. A scar cream may be used beginnning 1 month after the surgery. If you have any questions or concerns, please call our office Monday through Friday at [phone]. Introducing [facility]! Siria Ventura introduces [employer] patient portal. Now you can access parts of your medical record, email your doctor's office, and request medication refills online. 1. In your internet browser, go to [email or web address] 2. Click on the First Time User? Click Here link in the Sign In box.  You will see the New Member Sign Up page. 3. Enter your [employer] Access Code exactly as it appears below. You will not need to use this code after youve completed the sign-up process. If you do not sign up before the expiration date, you must request a new code. · [employer] Access Code: [email or web address] Expires: 4/29/2018  4:58 PM 
 
4. Enter the last four digits of your Social Security Number (xxxx) and Date of Birth (mm/dd/yyyy) as indicated and click Submit. You will be taken to the next sign-up page. 5. Create a [employer] ID. This will be your [employer] login ID and cannot be changed, so think of one that is secure and easy to remember. 6. Create a [employer] password. You can change your password at any time. 7. Enter your Password Reset Question and Answer. This can be used at a later time if you forget your password. 8. Enter your e-mail address. You will receive e-mail notification when new information is available in [address]. 9. Click Sign Up. You can now view and download portions of your medical record. 10. Click the Download Summary menu link to download a portable copy of your medical information. If you have questions, please visit the Frequently Asked Questions section of the [employer] website. Remember, [employer] is NOT to be used for urgent needs. For medical emergencies, dial 911. Now available from your iPhone and Android! Please provide this summary of care documentation to your next provider. Your primary care clinician is listed as Vanessa Irving. If you have any questions after today's visit, please call [phone].

## 2018-03-20 ENCOUNTER — OFFICE VISIT (OUTPATIENT)
Dept: DERMATOLOGY | Facility: AMBULATORY SURGERY CENTER | Age: 78
End: 2018-03-20

## 2018-03-20 VITALS
SYSTOLIC BLOOD PRESSURE: 120 MMHG | OXYGEN SATURATION: 98 % | WEIGHT: 130 LBS | DIASTOLIC BLOOD PRESSURE: 50 MMHG | RESPIRATION RATE: 16 BRPM | HEART RATE: 50 BPM | HEIGHT: 67 IN | TEMPERATURE: 98.5 F | BODY MASS INDEX: 20.4 KG/M2

## 2018-03-20 DIAGNOSIS — L71.1 RHINOPHYMA: ICD-10-CM

## 2018-03-20 DIAGNOSIS — Z85.828 HISTORY OF NONMELANOMA SKIN CANCER: ICD-10-CM

## 2018-03-20 DIAGNOSIS — C44.311 BASAL CELL CARCINOMA OF LEFT NASAL TIP: Primary | ICD-10-CM

## 2018-03-20 DIAGNOSIS — L90.5 SCAR CONDITION AND FIBROSIS OF SKIN: ICD-10-CM

## 2018-03-20 NOTE — PROGRESS NOTES
Mr. Fili Singh comes in for follow-up. He has had numerous nonmelanoma skin cancers recently treated on his face and neck, surgical sites are healing well. He presents to discuss treatment of his basal cell carcinoma and his nasal tip. This is a crusted lesion, not producing significant symptoms. He is feeling well and in his usual state of health today. He has no pain, no current illnesses, no other skin concerns. His allergies medications medical and social history are reviewed by me today. I have reviewed the history, physical exam, assessment and plan by Juan Molina NP and agree. He is awake alert man who appears well. Surgical sites on the face and neck have healed very well without evidence of recurrence. His nasal tip has a crested patch at least 1 cm in size in the setting of significant solar elastosis and rhinophyma. Basal cell carcinoma nasal tip. This will be treated in 2 weeks with Mohs surgery. Primary closure of the wound is anticipated.

## 2018-03-20 NOTE — MR AVS SNAPSHOT
[address] [facility] [address] 
[phone] Patient: Milly Pruett MRN: [id number] ELIAS:[birth year] Visit Information Date & Time Provider Department Dept. Phone Encounter #  
 3/20/2018  8:30 AM PILY Ahmdai [id number] [phone] Your Appointments 3/20/2018  8:30 AM  
CONSULT with PILY Ahmadi [facility]) Appt Note: [address] [facility] [address] [facility] [phone]  
  
   
 [address] [address]  
  
    
 4/4/2018  8:15 AM  
SURGERY with MD Darrick Cartagena [facility]) Appt Note: mohs scc si situ 3cm [facility] [address] [facility] [phone]  
  
   
 [address] [address]  
  
    
 5/10/2018  8:30 AM  
SURGERY with MD Darrick Cartagena [facility]) Appt Note: mohs bcc left nasal tip [facility] [address] [place] [address] [phone]  
  
    
 6/5/2018  8:15 AM  
SURGERY with MD Darrick [place]agena [facility]) Appt Note: mohs scc scc left temple shave biopsy [facility] [address]  
[phone] Upcoming Health Maintenance Date Due DTaP/Tdap/Td series (1 - Tdap) 5/28/1959 ZOSTER VACCINE AGE 60> 3/28/1998 GLAUCOMA SCREENING Q2Y 5/28/2003 Pneumococcal 65+ High/Highest Risk (2 of 2 - PPSV23) 3/14/2017 Influenza Age 5 to Adult 8/1/2017 MEDICARE YEARLY EXAM 3/14/2018 Allergies as of 3/20/2018  Review Complete On: 3/20/2018 By: Josefina Garcia LPN Severity Noted Reaction Type Reactions Codeine  04/29/2011    Unknown (comments) Penicillins  04/29/2011    Unknown (comments) Current Immunizations  Reviewed on 3/25/2012 Name Date Influenza Vaccine Split 11/16/2011 ZZZ-RETIRED (DO NOT USE) Pneumococcal Vaccine (Unspecified Type) 3/14/2012 11:51 AM  
  
 Not reviewed this visit Vitals BP Pulse Temp Resp Height(growth percentile) Weight(growth percentile) 120/50 (BP 1 Location: Left arm, BP Patient Position: Sitting) (!) 50 98.5 °F (36.9 °C) (Oral) 16 5' 7\" (1.702 m) 130 lb (59 kg) SpO2 BMI Smoking Status 98% 20.36 kg/m2 Current Every Day Smoker BMI and BSA Data Body Mass Index Body Surface Area  
 20.36 kg/m 2 1.67 m 2 Preferred Pharmacy Pharmacy Name Phone [id number] [place] - [address] [facility] AT [address] [place] [phone] Your Updated Medication List  
  
   
This list is accurate as of 3/20/18  8:26 AM.  Always use your most recent med list.  
  
  
  
  
 albuterol 90 mcg/actuation inhaler Commonly known as:  PROVENTIL HFA, VENTOLIN HFA, PROAIR HFA Take 1 Puff by inhalation every six (6) hours as needed for Wheezing or Shortness of Breath. aluminum-magnesium hydroxide 200-200 mg/5 mL suspension Commonly known as:  MAALOX Take 15 mL by mouth four (4) times daily as needed for Indigestion. aspirin 81 mg chewable tablet Take 1 Tab by mouth daily. budesonide-formoterol 160-4.5 mcg/actuation Hfaa Commonly known as:  SYMBICORT Take 2 Puffs by inhalation two (2) times a day. * bumetanide 2 mg tablet Commonly known as:  Bendena Yesika Take 1 mg by mouth daily. * bumetanide 1 mg tablet Commonly known as:  Bendena Yesika TAKE 1 TABLET IN THE MORNING AND 1 TABLET IN THE EVENING  
  
 calcium carbonate 200 mg calcium (500 mg) Chew Commonly known as:  TUMS Take 1 Tab by mouth three (3) times daily (with meals). carvedilol 12.5 mg tablet Commonly known as:  Diego Ramos  
 Take 1 Tab by mouth two (2) times daily (with meals). doxazosin 8 mg tablet Commonly known as:  CARDURA Take 8 mg by mouth daily. doxycycline 100 mg tablet Commonly known as:  ADOXA Take 100 mg by mouth two (2) times a day. fenofibrate 160 mg tablet Commonly known as:  LOFIBRA TAKE 1 TABLET DAILY  
  
 fenofibrate nanocrystallized 145 mg tablet Commonly known as:  [employer] Take 1 Tab by mouth daily. furosemide 40 mg tablet Commonly known as:  LASIX 40 mg two times daily  
  
 isosorbide mononitrate ER 30 mg tablet Commonly known as:  IMDUR Take  by mouth daily. * LIPITOR PO Take 10 mg by mouth daily. * atorvastatin 10 mg tablet Commonly known as:  LIPITOR  
TAKE ONE (1) TABLET DAILY AT BEDTIME  
  
 lisinopril 5 mg tablet Commonly known as:  Rheta Tamica Take 1 Tab by mouth daily. mupirocin 2 % ointment Commonly known as:  BACTROBAN  
APPLY 1 APPLICATION TO THE AFFECTED AREA(S) OF RIGHT TEMPLE OF FACE TWICE A DAY  
  
 nitroglycerin 0.4 mg SL tablet Commonly known as:  NITROSTAT PLACE 1 TABLET UNDER TONGUE AT 1ST SIGN OF ATTACK. REPEAT EVERY 5 MIN UNTIL RELIEF. CALL 911  
  
 oxyCODONE-acetaminophen 5-325 mg per tablet Commonly known as:  PERCOCET Take 1 Tab by mouth every four (4) hours as needed for Pain.  
  
 pantoprazole 40 mg tablet Commonly known as:  PROTONIX Take 1 Tab by mouth daily. PLAVIX 75 mg Tab Generic drug:  clopidogrel Take 75 mg by mouth daily. * potassium chloride SR 10 mEq tablet Commonly known as:  KLOR-CON 10 Take 1 Tab by mouth two (2) times a day. * KLOR-CON M10 10 mEq tablet Generic drug:  potassium chloride TAKE ONE (1) TABLET(S) TWICE DAILY. TAKE WITH L ASIX (FUROSEMIDE). * Notice: This list has 6 medication(s) that are the same as other medications prescribed for you.  Read the directions carefully, and ask your doctor or other care provider to review them with you. Patient Instructions Self Skin Exam and Sunscreens Early detection and treatment is essential in the treatment of all forms of skin cancer. If caught early, all forms of skin cancer are curable. In addition to your regular visits, you should perform a monthly skin examination. Over time, you become familiar with what is normally found on your skin and can identify new or suspicious spots. One of the screening tools you can use to assess your skin is to follow the ABCDEs: 
 
A= Asymmetry (One half is unlike the other half) B= Border (An irregular, scalloped or poorly defined edge) C= Color (Is varied from one area to another, has shades of tan, brown/ black,       white, red or blue) D= Diameter (Spots larger than 6mm or a pencil eraser) E= Evolving (New spots or one that is changing in size, shape, or color) A follow- up interval will be customized based on your history of skin cancer or level of skin damage and risk factors. In any case, if you notice a suspicious or new spot, an appointment should be arranged between regular visits. Everyone should use sunscreen and sun-safe practices, which is especially important for those with a personal or family history of skin cancer. Suggestions for this include: 1. Use daily moisturizers containing SPF 30 or higher. 2. Wear long sleeve clothing with UPF ratings and a broad-brimmed hat. 3. Apply sunscreen with SPF 30 or higher to all sun exposed areas if you are going to be in the sun. A broad spectrum UVA/ UVB sunscreen is best.  Dont forget to REAPPLY every two hours or more often if swimming or sweating! 4. Avoid outside activities during peak sun hours, especially in the summer (10am- 2pm). 5. DO NOT use tanning beds.  
 
Using sunscreen and sun-safe practices can help reduce the likelihood of developing skin cancer or additional skin cancers in those previously diagnosed. Introducing [address]! Shruthi Stroud introduces [employer] patient portal. Now you can access parts of your medical record, email your doctor's office, and request medication refills online. 1. In your internet browser, go to [email or web address] 2. Click on the First Time User? Click Here link in the Sign In box. You will see the New Member Sign Up page. 3. Enter your [employer] Access Code exactly as it appears below. You will not need to use this code after youve completed the sign-up process. If you do not sign up before the expiration date, you must request a new code. · [employer] Access Code: [email or web address] Expires: 4/29/2018  5:58 PM 
 
4. Enter the last four digits of your Social Security Number (xxxx) and Date of Birth (mm/dd/yyyy) as indicated and click Submit. You will be taken to the next sign-up page. 5. Create a [employer] ID. This will be your [employer] login ID and cannot be changed, so think of one that is secure and easy to remember. 6. Create a [employer] password. You can change your password at any time. 7. Enter your Password Reset Question and Answer. This can be used at a later time if you forget your password. 8. Enter your e-mail address. You will receive e-mail notification when new information is available in [address] 9. Click Sign Up. You can now view and download portions of your medical record. 10. Click the Download Summary menu link to download a portable copy of your medical information. If you have questions, please visit the Frequently Asked Questions section of the [employer] website. Remember, [employer] is NOT to be used for urgent needs. For medical emergencies, dial 911. Now available from your iPhone and Android! Please provide this summary of care documentation to your next provider. Your primary care clinician is listed as Rhonda Vazquez. If you have any questions after today's visit, please call [phone].

## 2018-03-20 NOTE — PROGRESS NOTES
Name: Ronnie Curtis       Age: 76 y.o. Date: 3/20/2018    Chief Complaint: had concerns including Skin Exam.    Subjective:    HPI:  Mr.. Ronnie Curtis is a 76 y.o. male who is here to discuss Mohs surgery for a lesion on the left nasal tip. The lesion appeared  months ago. The patient has not had any prior treatment. Associated symptoms include persistent scaly lesion. The patient's pathology report confirms Basal Cell Carcinoma. He has healed well from the larger surgeries. No associated symptoms. ROS: Consitutional: Negative. Dermatological : positive for - skin lesion changes    Social History     Social History    Marital status:      Spouse name: N/A    Number of children: N/A    Years of education: N/A     Occupational History    Not on file.      Social History Main Topics    Smoking status: Current Every Day Smoker     Packs/day: 1.00     Years: 55.00    Smokeless tobacco: Never Used    Alcohol use No    Drug use: Yes     Special: Prescription    Sexual activity: Not Currently     Other Topics Concern    Not on file     Social History Narrative       Family History   Problem Relation Age of Onset    Heart Disease Mother     Heart Disease Father        Past Medical History:   Diagnosis Date    CAD (coronary artery disease)     Cancer ([facility] [address] 2005    prostate    Cardiac defibrillator in place     Heart failure ([facility] [address]     Hypertension     Skin cancer     Sun-damaged skin     Thromboembolus ([facility] [address] 2005    r leg       Past Surgical History:   Procedure Laterality Date    CARDIAC SURG PROCEDURE UNLIST  2006    cardiac bypass    HX APPENDECTOMY      HX MOHS PROCEDURES  10/26/2017    SCC in situ R temple by Dr. Alicia Bell  01/29/2018    [address] by Dr. Remedios Jenkins (external)    HX PROSTATECTOMY      prostate sgy       Current Outpatient Prescriptions   Medication Sig Dispense Refill    atorvastatin (LIPITOR) 10 mg tablet TAKE ONE (1) TABLET DAILY AT BEDTIME  11    bumetanide (BUMEX) 1 mg tablet TAKE 1 TABLET IN THE MORNING AND 1 TABLET IN THE EVENING  8    fenofibrate (LOFIBRA) 160 mg tablet TAKE 1 TABLET DAILY  11    mupirocin (BACTROBAN) 2 % ointment APPLY 1 APPLICATION TO THE AFFECTED AREA(S) OF RIGHT TEMPLE OF FACE TWICE A DAY  1    nitroglycerin (NITROSTAT) 0.4 mg SL tablet PLACE 1 TABLET UNDER TONGUE AT 1ST SIGN OF ATTACK. REPEAT EVERY 5 MIN UNTIL RELIEF. CALL 911  0    KLOR-CON M10 10 mEq tablet TAKE ONE (1) TABLET(S) TWICE DAILY. TAKE WITH L ASIX (FUROSEMIDE). 5    isosorbide mononitrate ER (IMDUR) 30 mg tablet Take  by mouth daily.  bumetanide (BUMEX) 2 mg tablet Take 1 mg by mouth daily.  doxycycline (ADOXA) 100 mg tablet Take 100 mg by mouth two (2) times a day.  oxyCODONE-acetaminophen (PERCOCET) 5-325 mg per tablet Take 1 Tab by mouth every four (4) hours as needed for Pain. 30 Tab 0    furosemide (LASIX) 40 mg tablet 40 mg two times daily   60 Tab 0    potassium chloride SR (KLOR-CON 10) 10 mEq tablet Take 1 Tab by mouth two (2) times a day. 60 Tab 0    lisinopril (PRINIVIL, ZESTRIL) 5 mg tablet Take 1 Tab by mouth daily. 30 Tab 0    fenofibrate nanocrystallized (TRICOR) 145 mg tablet Take 1 Tab by mouth daily. 30 Tab 0    carvedilol (COREG) 12.5 mg tablet Take 1 Tab by mouth two (2) times daily (with meals). 30 Tab 0    calcium carbonate (TUMS) 200 mg calcium (500 mg) Chew Take 1 Tab by mouth three (3) times daily (with meals). 30 Tab 0    budesonide-formoterol (SYMBICORT) 160-4.5 mcg/Actuation HFA inhaler Take 2 Puffs by inhalation two (2) times a day. 1 Inhaler 1    aspirin 81 mg chewable tablet Take 1 Tab by mouth daily. 30 Tab 0    aluminum-magnesium hydroxide (MAALOX) 200-200 mg/5 mL suspension Take 15 mL by mouth four (4) times daily as needed for Indigestion. 300 mL 0    pantoprazole (PROTONIX) 40 mg tablet Take 1 Tab by mouth daily.  30 Tab 0    albuterol (PROVENTIL HFA, VENTOLIN HFA) 90 mcg/Actuation inhaler Take 1 Puff by inhalation every six (6) hours as needed for Wheezing or Shortness of Breath. 1 Inhaler 1    doxazosin (CARDURA) 8 mg tablet Take 8 mg by mouth daily.  clopidogrel (PLAVIX) 75 mg tablet Take 75 mg by mouth daily.  ATORVASTATIN CALCIUM (LIPITOR PO) Take 10 mg by mouth daily. Allergies   Allergen Reactions    Codeine Unknown (comments)    Penicillins Unknown (comments)         Objective:    Visit Vitals    /50 (BP 1 Location: Left arm, BP Patient Position: Sitting)    Pulse (!) 50    Temp 98.5 °F (36.9 °C) (Oral)    Resp 16    Ht 5' 7\" (1.702 m)    Wt 59 kg (130 lb)    SpO2 98%    BMI 20.36 kg/m2       Inder Coy is a 76 y.o. male who appears well and in no distress. He is awake, alert, and oriented. There is no preauricular, submandibular, or cervical lymphadenopathy. A limited skin evaluation was completed including the right temple is well healed and the left nasal tip has a 12 mm diameter depressed area of crusting. He has rhinophyma. Assessment/Plan:    1. Personal history of skin cancer. I discussed sun protection, sunscreen use, the warning signs of skin cancer, the need for self-skin examinations, and the need for regular practitioner exams every 6 months. The patient should follow up sooner as needed if new, changing, or symptomatic skin lesions arise. 2. Basal cell carcinoma of the left nasal tip. The diagnosis discussed. He is familiar with mohs surgery and is willing to proceed on April 4th as scheduled. Primary closure here was discussed. 3. Rhinophyma.

## 2018-04-04 ENCOUNTER — OFFICE VISIT (OUTPATIENT)
Dept: DERMATOLOGY | Facility: AMBULATORY SURGERY CENTER | Age: 78
End: 2018-04-04

## 2018-04-04 VITALS
WEIGHT: 130 LBS | HEIGHT: 67 IN | RESPIRATION RATE: 16 BRPM | DIASTOLIC BLOOD PRESSURE: 50 MMHG | TEMPERATURE: 98.5 F | OXYGEN SATURATION: 99 % | BODY MASS INDEX: 20.4 KG/M2 | HEART RATE: 53 BPM | SYSTOLIC BLOOD PRESSURE: 128 MMHG

## 2018-04-04 DIAGNOSIS — C44.311 BASAL CELL CARCINOMA OF LEFT NASAL TIP: Primary | ICD-10-CM

## 2018-04-04 DIAGNOSIS — C44.321 SQUAMOUS CELL SKIN CANCER, NASAL TIP: ICD-10-CM

## 2018-04-04 NOTE — MR AVS SNAPSHOT
[address] [facility] [address] 
[phone] Patient: Makeda Orellana MRN: [id number] BMO:[birth year] Visit Information Date & Time Provider Department Dept. Phone Encounter #  
 4/4/2018  8:15 AM Donnis Mortimer, MD [place] [phone] Upcoming Health Maintenance Date Due DTaP/Tdap/Td series (1 - Tdap) 5/28/1961 ZOSTER VACCINE AGE 60> 3/28/1998 GLAUCOMA SCREENING Q2Y 5/28/2003 Pneumococcal 65+ High/Highest Risk (2 of 2 - PPSV23) 3/14/2017 Influenza Age 5 to Adult 8/1/2017 MEDICARE YEARLY EXAM 3/14/2018 Allergies as of 4/4/2018  Review Complete On: 4/4/2018 By: Donnis Mortimer, MD  
  
 Severity Noted Reaction Type Reactions Codeine  04/29/2011    Unknown (comments) Penicillins  04/29/2011    Unknown (comments) Current Immunizations  Reviewed on 3/25/2012 Name Date Influenza Vaccine Split 11/16/2011 ZZZ-RETIRED (DO NOT USE) Pneumococcal Vaccine (Unspecified Type) 3/14/2012 11:51 AM  
  
 Not reviewed this visit You Were Diagnosed With   
  
 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  
 20.36 kg/m 2 1.67 m 2 Preferred Pharmacy Pharmacy Name Phone [id number] [place] [phone] [facility] AT [address] [place] [address] [phone] Your Updated Medication List  
  
   
This list is accurate as of 4/4/18  8:28 AM.  Always use your most recent med list.  
  
  
  
  
 albuterol 90 mcg/actuation inhaler Commonly known as:  PROVENTIL HFA, VENTOLIN HFA, PROAIR HFA Take 1 Puff by inhalation every six (6) hours as needed for Wheezing or Shortness of Breath. aluminum-magnesium hydroxide 200-200 mg/5 mL suspension Commonly known as:  MAALOX Take 15 mL by mouth four (4) times daily as needed for Indigestion. aspirin 81 mg chewable tablet Take 1 Tab by mouth daily. budesonide-formoterol 160-4.5 mcg/actuation Hfaa Commonly known as:  SYMBICORT Take 2 Puffs by inhalation two (2) times a day. * bumetanide 2 mg tablet Commonly known as:  Bradshaw Hora Take 1 mg by mouth daily. * bumetanide 1 mg tablet Commonly known as:  Bradshaw Hora TAKE 1 TABLET IN THE MORNING AND 1 TABLET IN THE EVENING  
  
 calcium carbonate 200 mg calcium (500 mg) Chew Commonly known as:  TUMS Take 1 Tab by mouth three (3) times daily (with meals). carvedilol 12.5 mg tablet Commonly known as:  Anna Awe Take 1 Tab by mouth two (2) times daily (with meals). doxazosin 8 mg tablet Commonly known as:  CARDURA Take 8 mg by mouth daily. doxycycline 100 mg tablet Commonly known as:  ADOXA Take 100 mg by mouth two (2) times a day. fenofibrate 160 mg tablet Commonly known as:  LOFIBRA TAKE 1 TABLET DAILY  
  
 fenofibrate nanocrystallized 145 mg tablet Commonly known as:  [employer] Take 1 Tab by mouth daily. furosemide 40 mg tablet Commonly known as:  LASIX 40 mg two times daily  
  
 isosorbide mononitrate ER 30 mg tablet Commonly known as:  IMDUR Take  by mouth daily. * LIPITOR PO Take 10 mg by mouth daily. * atorvastatin 10 mg tablet Commonly known as:  LIPITOR  
TAKE ONE (1) TABLET DAILY AT BEDTIME  
  
 lisinopril 5 mg tablet Commonly known as:  [employer] Take 1 Tab by mouth daily. mupirocin 2 % ointment Commonly known as:  [facility]  
 APPLY 1 APPLICATION TO THE AFFECTED AREA(S) OF RIGHT TEMPLE OF FACE TWICE A DAY  
  
 nitroglycerin 0.4 mg SL tablet Commonly known as:  NITROSTAT PLACE 1 TABLET UNDER TONGUE AT 1ST SIGN OF ATTACK. REPEAT EVERY 5 MIN UNTIL RELIEF. CALL 911  
  
 oxyCODONE-acetaminophen 5-325 mg per tablet Commonly known as:  PERCOCET Take 1 Tab by mouth every four (4) hours as needed for Pain.  
  
 pantoprazole 40 mg tablet Commonly known as:  PROTONIX Take 1 Tab by mouth daily. PLAVIX 75 mg Tab Generic drug:  clopidogrel Take 75 mg by mouth daily. * potassium chloride SR 10 mEq tablet Commonly known as:  KLOR-CON 10 Take 1 Tab by mouth two (2) times a day. * KLOR-CON M10 10 mEq tablet Generic drug:  potassium chloride TAKE ONE (1) TABLET(S) TWICE DAILY. TAKE WITH L ASIX (FUROSEMIDE). * Notice: This list has 6 medication(s) that are the same as other medications prescribed for you. Read the directions carefully, and ask your doctor or other care provider to review them with you. Patient Instructions WOUND CARE INSTRUCTIONS 1. Keep the dressing clean and dry and do not remove for 48 hours. 2. Then change the dressing once a day as follows: 
a. Wash hands before and after each dressing change. b. Remove dressing and wash site gently with mild soap and water, rinse, and pat dry. 
c. Apply an ointment (Bacitracin, Polysporin, Neosporin, Petroleum jelly or Aquaphor). d. Apply a non-stick (Telfa) dressing or Band-Aid to cover the wound. Remove pressure bandage on Friday, then wash gently and apply a thin layer Vaseline and a band-aid to site daily for 1 week. 3. Watch for: BLEEDING: A small amount of drainage may occur. If bleeding occurs, elevate and rest the surgery site. Apply gauze and steady pressure for 15 minutes. If bleeding continues, call this office.  
 
INFECTION: Signs of infection include increased redness, pain, warmth, drainage of pus, and fever. If this occurs, call this office. 4. Special Instructions (follow any that are checked): 
· [x] You have stitches that DO NOT need to be removed. · [x] Avoid bending at the waist and heavy lifting for two days. · [x] Sleep with your head elevated for the next two nights. · [x] Rest the surgery site and keep it elevated as much as possible for two days. · [x] You may apply an ice-pack for 10-15 minutes every waking hour for the rest of the day. · [] Eat a soft diet and avoid hot food and hot drinks for the rest of the day. · [] Other instructions: Follow up as directed. Take Tylenol or Ibuprofen for pain as needed. Once the site is healed with no remaining bandages or open areas, protect your surgical site and scar from the sun, as this area will be more sensitive. Use a broad spectrum sunscreen SPF 30 or higher daily, and a chemical free product (one containing zinc oxide or titanium dioxide) is a good choice if the area is sensitive. You may begin to gently massage the surgical site in 2-3 weeks, rubbing in a circular motion along the scar. This can help reduce swelling and thickness of a scar. A scar cream may be used beginnning 1 month after the surgery. If you have any questions or concerns, please call our office Monday through Friday at [phone]. Introducing [facility] & HEALTH SERVICES! [employer] introduces [employer] patient portal. Now you can access parts of your medical record, email your doctor's office, and request medication refills online. 1. In your internet browser, go to [email or web address] 2. Click on the First Time User? Click Here link in the Sign In box. You will see the New Member Sign Up page. 3. Enter your [employer] Access Code exactly as it appears below. You will not need to use this code after youve completed the sign-up process. If you do not sign up before the expiration date, you must request a new code. · [employer] Access Code: [email or web address] Expires: 4/29/2018  5:58 PM 
 
4. Enter the last four digits of your Social Security Number (xxxx) and Date of Birth (mm/dd/yyyy) as indicated and click Submit. You will be taken to the next sign-up page. 5. Create a [employer] ID. This will be your [employer] login ID and cannot be changed, so think of one that is secure and easy to remember. 6. Create a [employer] password. You can change your password at any time. 7. Enter your Password Reset Question and Answer. This can be used at a later time if you forget your password. 8. Enter your e-mail address. You will receive e-mail notification when new information is available in [address] 9. Click Sign Up. You can now view and download portions of your medical record. 10. Click the Download Summary menu link to download a portable copy of your medical information. If you have questions, please visit the Frequently Asked Questions section of the [employer] website. Remember, [employer] is NOT to be used for urgent needs. For medical emergencies, dial 911. Now available from your iPhone and Android! Please provide this summary of care documentation to your next provider. Your primary care clinician is listed as Tori Gutierrez. If you have any questions after today's visit, please call [phone].

## 2018-04-04 NOTE — PROGRESS NOTES
This note is written by Magnolia Kaur, as dictated by Mitra Urias. Merle Arreaga MD.    CC: Basal cell carcinoma on the left nasal tip     History of present illness:     Marcio Friedman is a 76 y.o. male referred by Dr. Deb Pham. He has a biopsy-proven nodular and pigmented basal cell carcinoma on the left nasal tip. This is a new basal cell carcinoma present for more than seven months described as a rubbery lesion with no prior treatment. Biopsy confirmed the diagnosis of basal cell carcinoma, and I reviewed the written pathology report. He is feeling well and in his usual state of health today. He has no pain, no current illnesses, no other skin concerns. His allergies, medications, medical, and social history are reviewed by me today. He has a defibrillator and takes the blood thinner Plavix. Exam:     He is an awake, alert, and oriented 76 y.o. male who appears well and in no distress. There is no preauricular, submandibular, or cervical lymphadenopathy. I examined his nose. He has a 12 x 12 mm crusted erosion on his left nasal tip, and adjacent there is a larger pink nodule that has increased in size. He confirms location. Assessment/plan:    1. Basal cell carcinoma, left nasal tip. I discussed the diagnosis of basal cell carcinoma and summarized the pathology report. Mohs surgery is indicated by site and size. The procedure was discussed, verbal and written consent were obtained. I performed the procedure. Four stages were required to reach a tumor free plane. The adjacent pink nodule was identified to be a cystic SCC and was removed. The surgical defect was managed with a bilateral advancement flap. There were no complications. He will follow up as needed as the site heals. Indications, risks, and options were discussed with Marcio Friedman preoperatively.  Risks including, but not limited to: pain, bleeding, infection, tumor recurrence, scarring and damage to motor and/or sensory nerves, were discussed. Kam Monterroso chose Mohs surgery. Kam Monterroso was an acceptable surgery candidate. Kam Monterroso was placed in the appropriate position on the operating table in the Mohs surgery procedure room. The area was prepped and draped in the standard manner. Gentian violet was used to outline the clinical margins of the tumor. Local anesthesia was then obtained. The grossly visible tumor was then removed, an underlying layer was excised and mapped according to the Mohs technique, and the individual specimens examined microscopically. The process was repeated until microscopic examination of the tissue specimens confirmed a tumor-free plane. Hemostasis was obtained with heat, a 5-0 polysorb suture, and pressure. The wound was covered between stages with moist saline gauze. The wound management options of second intent healing, layered closure, local flap, or full thickness skin graft were discussed. Mr. Dinah Coe understands the aims, risks, alternatives, and possible complications and elects to proceed with a bilateral advancement flap. Mr. Dinah Coe was placed in a supine position on the operating table in the Mohs surgery procedure room. The area was prepped and draped in the standard manner. Gentian violet was used to outline the proposed flap.  1% lidocaine with epinephrine 1:100,000 was used to supplement the already existing anesthesia. The wound penetrated to the perichondrial layer and measured 30 x 18 mm. The wound margins were undermined in the subcutaneous plane. The flap was advanced into place. Hemostasis was obtained with spot electrocoagulation. 5-0 polysorb sutures were used to approximate the deep tissues and 6-0 fast gut sutures were used to approximate the skin edges. A pressure dressing utilizing Vaseline, Telfa, gauze and Coverroll was placed.   Wound care instructions (written and verbal) and a follow up appointment were given to the patient before discharge. Mr. Emeka Braxton was discharged in good condition. 2. History of nonmelanoma skin cancer. I discussed the diagnosis and recommend routine examinations with Dr. Kingsley Brian for surveillance. The documentation recorded by the scribe accurately reflects the service I personally performed and the decisions made by me. [facility]   OFFICE PROCEDURE PROGRESS NOTE     Chart reviewed for the following:     Gus Bush MD, have reviewed the History, Physical and updated the Allergic reactions for [address] performed immediately prior to start of procedure:     Gus Bush MD, have performed the following reviews on Marbella Talamantes prior to the start of the procedure:     * Patient was identified by name and date of birth   * Agreement on procedure being performed was verified   * Risks and Benefits explained to the patient   * Procedure site verified and marked as necessary   * Patient was positioned for comfort   * Consent was signed and verified     Time: 8:09 AM   Date of procedure: 4/4/2018  Procedure performed by: Mini Díaz.  Josie Bush MD   Provider assisted by: LPN   Patient assisted by: self   How tolerated by patient: tolerated the procedure well with no complications   Comments: none

## 2018-04-04 NOTE — PATIENT INSTRUCTIONS
WOUND CARE INSTRUCTIONS    1. Keep the dressing clean and dry and do not remove for 48 hours. 2. Then change the dressing once a day as follows:  a. Wash hands before and after each dressing change. b. Remove dressing and wash site gently with mild soap and water, rinse, and pat dry.  c. Apply an ointment (Bacitracin, Polysporin, Neosporin, Petroleum jelly or Aquaphor). d. Apply a non-stick (Telfa) dressing or Band-Aid to cover the wound. Remove pressure bandage on Friday, then wash gently and apply a thin layer Vaseline and a band-aid to site daily for 1 week. 3. Watch for:  BLEEDING: A small amount of drainage may occur. If bleeding occurs, elevate and rest the surgery site. Apply gauze and steady pressure for 15 minutes. If bleeding continues, call this office. INFECTION: Signs of infection include increased redness, pain, warmth, drainage of pus, and fever. If this occurs, call this office. 4. Special Instructions (follow any that are checked):  · [x] You have stitches that DO NOT need to be removed. · [x] Avoid bending at the waist and heavy lifting for two days. · [x] Sleep with your head elevated for the next two nights. · [x] Rest the surgery site and keep it elevated as much as possible for two days. · [x] You may apply an ice-pack for 10-15 minutes every waking hour for the rest of the day. · [] Eat a soft diet and avoid hot food and hot drinks for the rest of the day. · [] Other instructions: Follow up as directed. Take Tylenol or Ibuprofen for pain as needed. Once the site is healed with no remaining bandages or open areas, protect your surgical site and scar from the sun, as this area will be more sensitive. Use a broad spectrum sunscreen SPF 30 or higher daily, and a chemical free product (one containing zinc oxide or titanium dioxide) is a good choice if the area is sensitive.     You may begin to gently massage the surgical site in 2-3 weeks, rubbing in a circular motion along the scar. This can help reduce swelling and thickness of a scar. A scar cream may be used beginnning 1 month after the surgery. If you have any questions or concerns, please call our office Monday through Friday at [phone].

## 2019-01-08 ENCOUNTER — HOSPITAL ENCOUNTER (OUTPATIENT)
Dept: LAB | Age: 79
Discharge: HOME | End: 2019-01-08

## 2020-08-21 ENCOUNTER — HOSPITAL ENCOUNTER (OUTPATIENT)
Dept: PREADMISSION TESTING | Age: 80
Discharge: HOME | End: 2020-08-21
Payer: MEDICARE

## 2020-08-21 DIAGNOSIS — Z01.812 PRE-PROCEDURE LAB EXAM: ICD-10-CM

## 2020-08-21 PROCEDURE — 87635 SARS-COV-2 COVID-19 AMP PRB: CPT

## 2020-08-22 LAB — SARS-COV-2, COV2NT: NOT DETECTED

## 2020-08-26 ENCOUNTER — HOSPITAL ENCOUNTER (OUTPATIENT)
Age: 80
Setting detail: OUTPATIENT SURGERY
Discharge: HOME | End: 2020-08-26
Attending: INTERNAL MEDICINE | Admitting: INTERNAL MEDICINE
Payer: MEDICARE

## 2020-08-26 ENCOUNTER — APPOINTMENT (OUTPATIENT)
Dept: PREADMISSION TESTING | Age: 80
End: 2020-08-26
Payer: MEDICARE

## 2020-08-26 DIAGNOSIS — Z01.812 PRE-PROCEDURE LAB EXAM: ICD-10-CM

## 2020-08-26 DIAGNOSIS — I70.201 FEMORAL ARTERY OCCLUSION, RIGHT (HCC): ICD-10-CM

## 2020-08-26 PROCEDURE — 87635 SARS-COV-2 COVID-19 AMP PRB: CPT

## 2020-08-28 LAB — SARS-COV-2, COV2NT: NOT DETECTED

## 2020-08-31 ENCOUNTER — HOSPITAL ENCOUNTER (OUTPATIENT)
Age: 80
Setting detail: OUTPATIENT SURGERY
Discharge: HOME | End: 2020-08-31
Attending: INTERNAL MEDICINE | Admitting: INTERNAL MEDICINE
Payer: MEDICARE

## 2020-08-31 VITALS
RESPIRATION RATE: 20 BRPM | TEMPERATURE: 97.5 F | SYSTOLIC BLOOD PRESSURE: 138 MMHG | BODY MASS INDEX: 21.19 KG/M2 | OXYGEN SATURATION: 96 % | WEIGHT: 135 LBS | HEART RATE: 58 BPM | HEIGHT: 67 IN | DIASTOLIC BLOOD PRESSURE: 40 MMHG

## 2020-08-31 DIAGNOSIS — I70.201 OCCLUSION OF RIGHT FEMORAL ARTERY (HCC): ICD-10-CM

## 2020-08-31 LAB
ATRIAL RATE: 46 BPM
CALCULATED P AXIS, ECG09: 59 DEGREES
CALCULATED R AXIS, ECG10: 17 DEGREES
CALCULATED T AXIS, ECG11: 140 DEGREES
DIAGNOSIS, 93000: NORMAL
P-R INTERVAL, ECG05: 164 MS
Q-T INTERVAL, ECG07: 422 MS
QRS DURATION, ECG06: 140 MS
QTC CALCULATION (BEZET), ECG08: 474 MS
VENTRICULAR RATE, ECG03: 76 BPM

## 2020-08-31 PROCEDURE — C1769 GUIDE WIRE: HCPCS | Performed by: INTERNAL MEDICINE

## 2020-08-31 PROCEDURE — C1894 INTRO/SHEATH, NON-LASER: HCPCS | Performed by: INTERNAL MEDICINE

## 2020-08-31 PROCEDURE — 77030013715 HC INFL SYS MRTM -B: Performed by: INTERNAL MEDICINE

## 2020-08-31 PROCEDURE — 75630 X-RAY AORTA LEG ARTERIES: CPT | Performed by: INTERNAL MEDICINE

## 2020-08-31 PROCEDURE — 74011000636 HC RX REV CODE- 636: Performed by: INTERNAL MEDICINE

## 2020-08-31 PROCEDURE — 37236 OPEN/PERQ PLACE STENT 1ST: CPT | Performed by: INTERNAL MEDICINE

## 2020-08-31 PROCEDURE — 74011000258 HC RX REV CODE- 258: Performed by: INTERNAL MEDICINE

## 2020-08-31 PROCEDURE — 77030019569 HC BND COMPR RAD TERU -B: Performed by: INTERNAL MEDICINE

## 2020-08-31 PROCEDURE — 99152 MOD SED SAME PHYS/QHP 5/>YRS: CPT | Performed by: INTERNAL MEDICINE

## 2020-08-31 PROCEDURE — 75710 ARTERY X-RAYS ARM/LEG: CPT | Performed by: INTERNAL MEDICINE

## 2020-08-31 PROCEDURE — 36200 PLACE CATHETER IN AORTA: CPT

## 2020-08-31 PROCEDURE — 77030016699 HC CATH ANGI DX INFN1 CARD -A: Performed by: INTERNAL MEDICINE

## 2020-08-31 PROCEDURE — C1887 CATHETER, GUIDING: HCPCS | Performed by: INTERNAL MEDICINE

## 2020-08-31 PROCEDURE — 93005 ELECTROCARDIOGRAM TRACING: CPT

## 2020-08-31 PROCEDURE — C1725 CATH, TRANSLUMIN NON-LASER: HCPCS | Performed by: INTERNAL MEDICINE

## 2020-08-31 PROCEDURE — 74011250637 HC RX REV CODE- 250/637: Performed by: INTERNAL MEDICINE

## 2020-08-31 PROCEDURE — 77030004532 HC CATH ANGI DX IMP BSC -A: Performed by: INTERNAL MEDICINE

## 2020-08-31 PROCEDURE — 74011000250 HC RX REV CODE- 250: Performed by: INTERNAL MEDICINE

## 2020-08-31 PROCEDURE — 77030010221 HC SPLNT WR POS TELE -B: Performed by: INTERNAL MEDICINE

## 2020-08-31 PROCEDURE — 74011250636 HC RX REV CODE- 250/636: Performed by: INTERNAL MEDICINE

## 2020-08-31 PROCEDURE — 99153 MOD SED SAME PHYS/QHP EA: CPT | Performed by: INTERNAL MEDICINE

## 2020-08-31 PROCEDURE — 77030006078 HC TAPE GLOW N TEL LEMV -B: Performed by: INTERNAL MEDICINE

## 2020-08-31 PROCEDURE — 77030020268 HC MISC GENERAL SUPPLY: Performed by: INTERNAL MEDICINE

## 2020-08-31 PROCEDURE — C1876 STENT, NON-COA/NON-COV W/DEL: HCPCS | Performed by: INTERNAL MEDICINE

## 2020-08-31 PROCEDURE — 77030013744: Performed by: INTERNAL MEDICINE

## 2020-08-31 DEVICE — PXB35-07-37-080 STENT VISI PRO 035 V01
Type: IMPLANTABLE DEVICE | Status: FUNCTIONAL
Brand: VISI-PRO™

## 2020-08-31 DEVICE — MISAGO RX SELF-EXPANDING PERIPHERAL STENT
Type: IMPLANTABLE DEVICE | Status: FUNCTIONAL
Brand: R2P MISAGO

## 2020-08-31 RX ORDER — SODIUM CHLORIDE 9 MG/ML
3 INJECTION, SOLUTION INTRAVENOUS CONTINUOUS
Status: DISPENSED | OUTPATIENT
Start: 2020-08-31 | End: 2020-08-31

## 2020-08-31 RX ORDER — FENTANYL CITRATE 50 UG/ML
INJECTION, SOLUTION INTRAMUSCULAR; INTRAVENOUS AS NEEDED
Status: DISCONTINUED | OUTPATIENT
Start: 2020-08-31 | End: 2020-08-31 | Stop reason: HOSPADM

## 2020-08-31 RX ORDER — SODIUM CHLORIDE 9 MG/ML
1.5 INJECTION, SOLUTION INTRAVENOUS CONTINUOUS
Status: DISPENSED | OUTPATIENT
Start: 2020-08-31 | End: 2020-08-31

## 2020-08-31 RX ORDER — ACETAMINOPHEN 325 MG/1
650 TABLET ORAL
Status: DISCONTINUED | OUTPATIENT
Start: 2020-08-31 | End: 2020-08-31 | Stop reason: HOSPADM

## 2020-08-31 RX ORDER — CLOPIDOGREL 300 MG/1
TABLET, FILM COATED ORAL AS NEEDED
Status: DISCONTINUED | OUTPATIENT
Start: 2020-08-31 | End: 2020-08-31 | Stop reason: HOSPADM

## 2020-08-31 RX ORDER — HEPARIN SODIUM 1000 [USP'U]/ML
INJECTION, SOLUTION INTRAVENOUS; SUBCUTANEOUS AS NEEDED
Status: DISCONTINUED | OUTPATIENT
Start: 2020-08-31 | End: 2020-08-31 | Stop reason: HOSPADM

## 2020-08-31 RX ORDER — SODIUM CHLORIDE 9 MG/ML
25 INJECTION, SOLUTION INTRAVENOUS CONTINUOUS
Status: DISCONTINUED | OUTPATIENT
Start: 2020-08-31 | End: 2020-08-31

## 2020-08-31 RX ORDER — MIDAZOLAM HYDROCHLORIDE 1 MG/ML
INJECTION, SOLUTION INTRAMUSCULAR; INTRAVENOUS AS NEEDED
Status: DISCONTINUED | OUTPATIENT
Start: 2020-08-31 | End: 2020-08-31 | Stop reason: HOSPADM

## 2020-08-31 RX ORDER — HEPARIN SODIUM 200 [USP'U]/100ML
INJECTION, SOLUTION INTRAVENOUS
Status: COMPLETED | OUTPATIENT
Start: 2020-08-31 | End: 2020-08-31

## 2020-08-31 RX ORDER — SODIUM CHLORIDE 0.9 % (FLUSH) 0.9 %
5-40 SYRINGE (ML) INJECTION AS NEEDED
Status: DISCONTINUED | OUTPATIENT
Start: 2020-08-31 | End: 2020-08-31 | Stop reason: HOSPADM

## 2020-08-31 RX ORDER — SODIUM CHLORIDE 0.9 % (FLUSH) 0.9 %
5-40 SYRINGE (ML) INJECTION EVERY 8 HOURS
Status: DISCONTINUED | OUTPATIENT
Start: 2020-08-31 | End: 2020-08-31 | Stop reason: HOSPADM

## 2020-08-31 RX ORDER — VERAPAMIL HYDROCHLORIDE 2.5 MG/ML
INJECTION, SOLUTION INTRAVENOUS AS NEEDED
Status: DISCONTINUED | OUTPATIENT
Start: 2020-08-31 | End: 2020-08-31 | Stop reason: HOSPADM

## 2020-08-31 RX ORDER — LIDOCAINE HYDROCHLORIDE 10 MG/ML
INJECTION INFILTRATION; PERINEURAL AS NEEDED
Status: DISCONTINUED | OUTPATIENT
Start: 2020-08-31 | End: 2020-08-31 | Stop reason: HOSPADM

## 2020-08-31 RX ADMIN — SODIUM CHLORIDE 1.5 ML/KG/HR: 900 INJECTION, SOLUTION INTRAVENOUS at 10:40

## 2020-08-31 RX ADMIN — ACETAMINOPHEN 650 MG: 325 TABLET ORAL at 08:44

## 2020-08-31 RX ADMIN — SODIUM CHLORIDE 3 ML/KG/HR: 900 INJECTION, SOLUTION INTRAVENOUS at 09:27

## 2020-08-31 RX ADMIN — SODIUM CHLORIDE 25 ML/HR: 900 INJECTION, SOLUTION INTRAVENOUS at 08:00

## 2020-08-31 NOTE — PROGRESS NOTES
Cardiac Cath Lab Recovery Arrival Note:      Brie Ellis arrived to Cardiac Cath Lab, Recovery Area. Staff introduced to patient. Patient identifiers verified with NAME and DATE OF BIRTH. Procedure verified with patient. Consent forms reviewed and signed by patient or authorized representative and verified. Allergies verified. Patient and family oriented to department. Patient and family informed of procedure and plan of care. Questions answered with review. Patient prepped for procedure, per orders from physician, prior to arrival.    Patient on cardiac monitor, non-invasive blood pressure, SPO2 monitor. On room air. Patient is A&Ox 4. Patient reports no complaints. Patient in stretcher, in low position, with side rails up, call bell within reach, patient instructed to call if assistance as needed. Patient prep in: [address], [place] [address]. Family in: Tovanabordebra Oleary (friend) [phone].    Prep by: Raleigh Sevilla RN

## 2020-08-31 NOTE — Clinical Note
History and physical documented and up to date, allergies reviewed, lab results reviewed, pre-procedure education provided, patient verbalized understanding of procedure, procedural consent signed and patient is NPO. Posterior Auricular Interpolation Flap Text: A decision was made to reconstruct the defect utilizing an interpolation axial flap and a staged reconstruction.  A telfa template was made of the defect.  This telfa template was then used to outline the posterior auricular interpolation flap.  The donor area for the pedicle flap was then injected with anesthesia.  The flap was excised through the skin and subcutaneous tissue down to the layer of the underlying musculature.  The pedicle flap was carefully excised within this deep plane to maintain its blood supply.  The edges of the donor site were undermined.   The donor site was closed in a primary fashion.  The pedicle was then rotated into position and sutured.  Once the tube was sutured into place, adequate blood supply was confirmed with blanching and refill.  The pedicle was then wrapped with xeroform gauze and dressed appropriately with a telfa and gauze bandage to ensure continued blood supply and protect the attached pedicle.

## 2020-08-31 NOTE — Clinical Note
Peripheral Lesion 1. Balloon inflated using single inflation technique. Lesion #1: Pressure = 12 dior; Duration = 30 sec.

## 2020-08-31 NOTE — Clinical Note
Peripheral Lesion 1. Balloon-expanding stent inserted. Lesion 1: Denzel = 10 psi, Duration = 40 sec.    VISI-PRO 7 X 37

## 2020-08-31 NOTE — Clinical Note
MD SPOKE WITH DAUGHTER, LUZMA, VIA PHONE, TO UPDATE ON PROCEDURE AND VERIFY CONSENT TO PERFORM PTCA AND/OR STENTING OF LEFT SUBCLAVIAN ARTERY IN ORDER TO CONTINUE WITH LE ANGIOPLASTY.

## 2020-08-31 NOTE — PROGRESS NOTES
Cardiac Cath Lab Procedure Area Arrival Note:    Jasmyn Tse arrived to Cardiac Cath Lab, Procedure Area. Patient identifiers verified with NAME and DATE OF BIRTH. Procedure verified with patient. Consent forms verified. Allergies verified. Patient informed of procedure and plan of care. Questions answered with review. Patient voiced understanding of procedure and plan of care. Patient on cardiac monitor, non-invasive blood pressure, SPO2 monitor. On RA and placed on O2 @ 2 lpm via NC.  IV of NSS on pump at 75 ml/hr. Patient status doing well without problems. Patient is A&Ox 4. Patient reports no complaints of chest pain or shortness of breath. Patient medicated during procedure with orders obtained and verified by Dr. Kayla Recinos. Refer to patients Cardiac Cath Lab PROCEDURE REPORT for vital signs, assessment, status, and response during procedure, printed at end of case. Printed report on chart or scanned into chart. 1100 Transfer to [facility] from Procedure Area    Verbal report given to MIRIAN Caceres on Jasmyn Tse being transferred to Cardiac Cath Lab RR for routine progression of care   Patient is post AO/RO and stenting of L subclavian artery. procedure. Patient stable upon transfer to . Report consisted of patients Situation, Background, Assessment and   Recommendations(SBAR). Information from the following report(s) SBAR, Procedure Summary and MAR was reviewed with the receiving nurse. Opportunity for questions and clarification was provided. Patient medicated during procedure with orders obtained and verified by Dr. Kayla Recinos. Refer to patient PROCEDURE REPORT for vital signs, assessment, status, and response during procedure.

## 2020-08-31 NOTE — Clinical Note
Peripheral Lesion 1. Lesion #1: Pressure = 7 dior; Duration = 35 sec. Lesion #2: Pressure = 7 dior; Duration = 22 sec. Lesion #3: Pressure = 7 dior; Duration = 24 sec.

## 2020-08-31 NOTE — Clinical Note
Lesion 1: Location: left subclavian artery. History of prior treatment: No history of prior treatment.

## 2020-08-31 NOTE — PROGRESS NOTES
Discharge instructions reviewed with the patient; verbalized understanding. Written copy of instructions given to the patient. Patient assisted to stand and void. Patient assisted to change into clothing from home; peripheral IV removed and dressing placed to site. Patient discharged via wheelchair in the care of his friend, Jael Mireles.

## 2020-08-31 NOTE — DISCHARGE INSTRUCTIONS
Patient Education        Peripheral Artery Angioplasty: What to Expect at Home  Your Recovery  Your groin or leg may have a bruise or a small lump where the catheter was put in your groin. The area may feel sore for a day or two after the procedure. You can do light activities around the house but nothing strenuous for several days. After surgery, blood may flow better throughout your leg, which can decrease leg pain, numbness, and cramping. This care sheet gives you a general idea about how long it will take for you to recover. But each person recovers at a different pace. Follow the steps below to feel better as quickly as possible. How can you care for yourself at home? Activity  · Do not do strenuous exercise and do not lift anything heavy until your doctor says it is okay. This may be for a day or two. You can walk around the house and do light activity, such as cooking. · Go back to regular exercise when your doctor says it is okay. Walking is a good choice. · If you work, you will probably need to take 1 or 2 days off. It depends on the type of work you do and how you feel. Diet  · You can eat your normal diet. · Drink plenty of fluids (unless your doctor tells you not to). Medicines  · Your doctor will tell you if and when you can restart your medicines. He or she will also give you instructions about taking any new medicines. · If you take aspirin or some other blood thinner, ask your doctor if and when to start taking it again. Make sure that you understand exactly what your doctor wants you to do. · Be safe with medicines. Take your medicines exactly as prescribed. Call your doctor if you think you are having a problem with your medicine. · Your doctor may prescribe a blood thinner when you go home. This helps prevent blood clots. Be sure you get instructions about how to take your medicine safely. Blood thinners can cause serious bleeding problems.   Care of the catheter site  · Keep a bandage over the spot where the catheter was inserted for the first day, or for as long as your doctor recommends. · Put ice or a cold pack on the area for 10 to 20 minutes at a time to help with soreness or swelling. Do this every few hours. Put a thin cloth between the ice and your skin. · You may shower 24 to 48 hours after the procedure, if your doctor okays it. Pat the incision dry. · Do not soak the catheter site until it is healed. Don't take a bath for 1 week, or until your doctor tells you it is okay. · Watch for bleeding from the site. A small amount of blood (up to the size of a quarter) on the bandage can be normal.  · If you are bleeding, lie down and press on the area for 15 minutes to try to make it stop. If the bleeding does not stop, call your doctor or seek immediate medical care. Follow-up care is a key part of your treatment and safety. Be sure to make and go to all appointments, and call your doctor if you are having problems. It's also a good idea to know your test results and keep a list of the medicines you take. When should you call for help? [id number] anytime you think you may need emergency care. For example, call if:  · You passed out (lost consciousness). · You have severe trouble breathing. · You have sudden chest pain and shortness of breath, or you cough up blood. · Your groin is very swollen and you have a lump that is getting bigger under your skin where the catheter was put in. Call your doctor now or seek immediate medical care if:  · You have severe pain in your leg, or it becomes cold, pale, blue, tingly, or numb. · You are bleeding from the area where the catheter was put in your artery. · You have a fast-growing, painful lump at the catheter site. · You have signs of infection, such as:  ? Increased pain, swelling, warmth, or redness of the skin. ? Red streaks leading from the area. ? Pus draining from the area. ? A fever.   Watch closely for changes in your health, and be sure to contact your doctor if you have any problems. Where can you learn more? Go to http://www.gray.com/  Enter B505 in the search box to learn more about \"Peripheral Artery Angioplasty: What to Expect at Home. \"  Current as of: December 16, 2019               Content Version: 12.5  © 1337-5370 [employer]. Care instructions adapted under license by [employer] (which disclaims liability or warranty for this information). If you have questions about a medical condition or this instruction, always ask your healthcare professional. [address] any warranty or liability for your use of this information.

## 2020-08-31 NOTE — PROGRESS NOTES
TRANSFER - IN REPORT:    Verbal report received from Wing Schulte on Lidia Burton  being received from procedure for routine progression of care. Report consisted of patients Situation, Background, Assessment and Recommendations(SBAR). Information from the following report(s) Procedure Summary was reviewed with the receiving clinician. Opportunity for questions and clarification was provided. Assessment completed upon patients arrival to [address] [place] [address]. Cardiac Cath Lab Recovery Arrival Note:    Lidia Burton arrived to [facility] recovery area. Patient procedure= angiogram and subclavian stent x2. Patient on cardiac monitor, non-invasive blood pressure, SPO2 monitor. On O2 @ 2 lpm via nasal cannula. IV  of 0.9% normal saline on pump at 92 ml/hr. Patient status doing well without problems. Patient is A&Ox 4. Patient reports no complaints. PROCEDURE SITE CHECK:    Procedure site:without any bleeding or hematoma, no pain/discomfort reported at procedure site. No change in patient status. Continue to monitor patient and status.

## 2023-05-12 RX ORDER — CARVEDILOL 12.5 MG/1
TABLET ORAL 2 TIMES DAILY WITH MEALS
COMMUNITY
Start: 2012-03-14

## 2023-05-12 RX ORDER — FENOFIBRATE 160 MG/1
1 TABLET ORAL DAILY
COMMUNITY
Start: 2018-01-21

## 2023-05-12 RX ORDER — ALBUTEROL SULFATE 90 UG/1
1 AEROSOL, METERED RESPIRATORY (INHALATION) EVERY 6 HOURS PRN
COMMUNITY
Start: 2012-03-14

## 2023-05-12 RX ORDER — BUDESONIDE AND FORMOTEROL FUMARATE DIHYDRATE 160; 4.5 UG/1; UG/1
2 AEROSOL RESPIRATORY (INHALATION) 2 TIMES DAILY
COMMUNITY
Start: 2012-03-14

## 2023-05-12 RX ORDER — BUMETANIDE 1 MG/1
TABLET ORAL
COMMUNITY
Start: 2017-12-17

## 2023-05-12 RX ORDER — NITROGLYCERIN 0.4 MG/1
TABLET SUBLINGUAL
COMMUNITY
Start: 2018-01-05

## 2023-05-12 RX ORDER — ATORVASTATIN CALCIUM 10 MG/1
TABLET, FILM COATED ORAL
COMMUNITY
Start: 2018-01-21

## 2023-05-12 RX ORDER — POTASSIUM CHLORIDE 750 MG/1
TABLET, EXTENDED RELEASE ORAL
COMMUNITY
Start: 2017-12-27

## 2023-05-12 RX ORDER — CLOPIDOGREL BISULFATE 75 MG/1
75 TABLET ORAL DAILY
COMMUNITY

## 2023-05-12 RX ORDER — ISOSORBIDE MONONITRATE 30 MG/1
TABLET, EXTENDED RELEASE ORAL DAILY
COMMUNITY

## (undated) DEVICE — KIT MED IMAG CNTRST AGNT W/ IOPAMIDOL REUSE

## (undated) DEVICE — GUIDEWIRE VASC L260CM DIA0.035IN TIP L3MM STD EXCHG PTFE J

## (undated) DEVICE — SENSOR OXMTR AD PED DISP NSL ALAR SPO2 XHALE ASSURANCE

## (undated) DEVICE — TR BAND RADIAL ARTERY COMPRESSION DEVICE: Brand: TR BAND

## (undated) DEVICE — SPECIAL PROCEDURE DRAPE 32" X 34": Brand: SPECIAL PROCEDURE DRAPE

## (undated) DEVICE — RADIFOCUS GLIDEWIRE ADVANTAGE GUIDEWIRE: Brand: GLIDEWIRE ADVANTAGE

## (undated) DEVICE — GLIDESHEATH SLENDER ACCESS KIT: Brand: GLIDESHEATH SLENDER

## (undated) DEVICE — HI-TORQUE VERSACORE MODIFIED J GUIDE WIRE SYSTEM 260 CM: Brand: HI-TORQUE VERSACORE

## (undated) DEVICE — KIT MFLD ISOLATN NACL CNTRST PRT TBNG SPIK W/ PRSS TRNSDUC

## (undated) DEVICE — LEMAITRE STENT GUIDE (20 STRIPS): Brand: VASCUTAPE RADIOPAQUE TAPE

## (undated) DEVICE — ANGIOGRAPHY KIT

## (undated) DEVICE — WASTE KIT - ST MARY: Brand: MEDLINE INDUSTRIES, INC.

## (undated) DEVICE — RADIFOCUS GLIDECATH: Brand: GLIDECATH

## (undated) DEVICE — Device

## (undated) DEVICE — CATH BLLN PTA .035 7X40X80 -- EVERCROSS OTW

## (undated) DEVICE — CUSTOM KT PTCA INFL DEV K05 00052M

## (undated) DEVICE — SHIELD PROTCT W125XL165IN BLU SUBCLAV XENOLYTE

## (undated) DEVICE — PACK PROCEDURE SURG HRT CATH

## (undated) DEVICE — KIT HND CTRL 3 W STPCOCK ROT END 54IN PREM HI PRSS TBNG AT

## (undated) DEVICE — ANGIOGRAPHIC CATHETER: Brand: IMPULSE™

## (undated) DEVICE — SPLINT WR POS F/ARTERIAL ACC -- BX/10

## (undated) DEVICE — GDWIRE ANGIO SUP STF 0.035IN --